# Patient Record
Sex: FEMALE | URBAN - METROPOLITAN AREA
[De-identification: names, ages, dates, MRNs, and addresses within clinical notes are randomized per-mention and may not be internally consistent; named-entity substitution may affect disease eponyms.]

---

## 2021-04-28 ENCOUNTER — EMERGENCY (EMERGENCY)
Facility: HOSPITAL | Age: 56
LOS: 1 days | Discharge: HOME | End: 2021-04-28
Attending: EMERGENCY MEDICINE
Payer: COMMERCIAL

## 2021-04-28 VITALS
TEMPERATURE: 98 F | OXYGEN SATURATION: 100 % | HEIGHT: 70 IN | SYSTOLIC BLOOD PRESSURE: 152 MMHG | RESPIRATION RATE: 16 BRPM | WEIGHT: 147.05 LBS | DIASTOLIC BLOOD PRESSURE: 103 MMHG | HEART RATE: 68 BPM

## 2021-04-28 LAB
APPEARANCE UR: CLEAR — SIGNIFICANT CHANGE UP
BACTERIA # UR AUTO: ABNORMAL
BILIRUB UR-MCNC: ABNORMAL
COLOR SPEC: YELLOW — SIGNIFICANT CHANGE UP
DIFF PNL FLD: ABNORMAL
EPI CELLS # UR: SIGNIFICANT CHANGE UP
GLUCOSE UR QL: NEGATIVE — SIGNIFICANT CHANGE UP
KETONES UR-MCNC: NEGATIVE — SIGNIFICANT CHANGE UP
LEUKOCYTE ESTERASE UR-ACNC: NEGATIVE — SIGNIFICANT CHANGE UP
NITRITE UR-MCNC: POSITIVE
PH UR: 7 — SIGNIFICANT CHANGE UP (ref 5–8)
PROT UR-MCNC: NEGATIVE — SIGNIFICANT CHANGE UP
RBC CASTS # UR COMP ASSIST: ABNORMAL /HPF (ref 0–4)
SP GR SPEC: 1 — LOW (ref 1.01–1.02)
UROBILINOGEN FLD QL: 1
WBC UR QL: SIGNIFICANT CHANGE UP

## 2021-04-28 PROCEDURE — 74176 CT ABD & PELVIS W/O CONTRAST: CPT | Mod: 26,MG

## 2021-04-28 PROCEDURE — 99284 EMERGENCY DEPT VISIT MOD MDM: CPT | Mod: 25

## 2021-04-28 PROCEDURE — 74176 CT ABD & PELVIS W/O CONTRAST: CPT

## 2021-04-28 PROCEDURE — 81001 URINALYSIS AUTO W/SCOPE: CPT

## 2021-04-28 PROCEDURE — 96372 THER/PROPH/DIAG INJ SC/IM: CPT

## 2021-04-28 PROCEDURE — 87086 URINE CULTURE/COLONY COUNT: CPT

## 2021-04-28 PROCEDURE — 99284 EMERGENCY DEPT VISIT MOD MDM: CPT

## 2021-04-28 PROCEDURE — G1004: CPT

## 2021-04-28 RX ORDER — TAMSULOSIN HYDROCHLORIDE 0.4 MG/1
1 CAPSULE ORAL
Qty: 30 | Refills: 0
Start: 2021-04-28 | End: 2021-05-27

## 2021-04-28 RX ORDER — ONDANSETRON 8 MG/1
4 TABLET, FILM COATED ORAL ONCE
Refills: 0 | Status: COMPLETED | OUTPATIENT
Start: 2021-04-28 | End: 2021-04-28

## 2021-04-28 RX ORDER — CEPHALEXIN 500 MG
500 CAPSULE ORAL ONCE
Refills: 0 | Status: DISCONTINUED | OUTPATIENT
Start: 2021-04-28 | End: 2021-04-28

## 2021-04-28 RX ORDER — OXYCODONE AND ACETAMINOPHEN 5; 325 MG/1; MG/1
1 TABLET ORAL
Qty: 7 | Refills: 0
Start: 2021-04-28 | End: 2021-05-04

## 2021-04-28 RX ORDER — KETOROLAC TROMETHAMINE 30 MG/ML
15 SYRINGE (ML) INJECTION ONCE
Refills: 0 | Status: DISCONTINUED | OUTPATIENT
Start: 2021-04-28 | End: 2021-04-28

## 2021-04-28 RX ORDER — IBUPROFEN 200 MG
1 TABLET ORAL
Qty: 16 | Refills: 0
Start: 2021-04-28 | End: 2021-05-01

## 2021-04-28 RX ADMIN — Medication 15 MILLIGRAM(S): at 11:57

## 2021-04-28 NOTE — ED STATDOCS - PATIENT PORTAL LINK FT
You can access the FollowMyHealth Patient Portal offered by [facility] by registering at the following website: [email or web address]. By joining [employer]’s FollowMyHealth portal, you will also be able to view your health information using other applications (apps) compatible with our system.

## 2021-04-28 NOTE — ED STATDOCS - CARE PROVIDER_API CALL
Jimmie Walker)  Urology  [address], [address]  [place] [address]  Phone: [phone]  Fax: [phone]  Follow Up Time:

## 2021-04-28 NOTE — ED STATDOCS - PROGRESS NOTE DETAILS
pt was already on antibiotic for UTI - had negative urine culture 5 days ago - afebile - will wait for culture results before treating

## 2021-04-28 NOTE — ED STATDOCS - OBJECTIVE STATEMENT
54 y/o F with PMHx of HTN and asymptomatic kidney stones presents to ED c/o 2 weeks of feeling pelvic pressure s/p treatment for UTI. Pt had urine culture with GYN last week that was negative, developed left flank pain today. Donis dysuria, fever, chills. Unsure if hematuria because prescribed azo. (+) nausea, no vomiting. No other symptoms.

## 2021-04-28 NOTE — ED STATDOCS - ATTENDING CONTRIBUTION TO CARE
Lucía: I performed a face to face bedside interview with patient regarding history of present illness, review of symptoms and past medical history. I completed an independent physical exam and ordered tests/medications as needed.  I have discussed patient's plan of care with advanced care provider. The advanced care provider assisted in  executing the discussed plan. I was available for any questions or issues that may have arose during the execution of the plan of care.

## 2021-04-28 NOTE — ED STATDOCS - NS ED ROS FT
ROS: No fever/chills. No eye pain/changes in vision, No ear pain/sore throat/dysphagia, No chest pain/palpitations. No SOB/cough/. No abdominal pain, (+) nausea, no V/D, no black/bloody bm. No dysuria/frequency/discharge, No headache. No Dizziness.    No rashes or breaks in skin. No numbness/tingling/weakness. (+) left flank pain, (+) pelvic pressure

## 2021-04-28 NOTE — ED STATDOCS - CLINICAL SUMMARY MEDICAL DECISION MAKING FREE TEXT BOX
Pt with lower pressure and 1 day of left flank pain. Will check urine, CT renal, symptoms control and reassess.

## 2021-04-29 LAB
CULTURE RESULTS: SIGNIFICANT CHANGE UP
SPECIMEN SOURCE: SIGNIFICANT CHANGE UP

## 2021-09-25 ENCOUNTER — EMERGENCY (EMERGENCY)
Facility: HOSPITAL | Age: 56
LOS: 1 days | Discharge: HOME | End: 2021-09-25
Attending: EMERGENCY MEDICINE
Payer: COMMERCIAL

## 2021-09-25 VITALS
HEART RATE: 58 BPM | TEMPERATURE: 98 F | SYSTOLIC BLOOD PRESSURE: 184 MMHG | RESPIRATION RATE: 18 BRPM | HEIGHT: 70 IN | WEIGHT: 149.91 LBS | OXYGEN SATURATION: 98 % | DIASTOLIC BLOOD PRESSURE: 81 MMHG

## 2021-09-25 LAB
APPEARANCE UR: CLEAR — SIGNIFICANT CHANGE UP
BACTERIA # UR AUTO: ABNORMAL
BASOPHILS # BLD AUTO: 0.05 K/UL — SIGNIFICANT CHANGE UP (ref 0–0.2)
BASOPHILS NFR BLD AUTO: 0.6 % — SIGNIFICANT CHANGE UP (ref 0–2)
BILIRUB UR-MCNC: ABNORMAL
COLOR SPEC: YELLOW — SIGNIFICANT CHANGE UP
DIFF PNL FLD: ABNORMAL
EOSINOPHIL # BLD AUTO: 0.16 K/UL — SIGNIFICANT CHANGE UP (ref 0–0.5)
EOSINOPHIL NFR BLD AUTO: 2 % — SIGNIFICANT CHANGE UP (ref 0–6)
EPI CELLS # UR: SIGNIFICANT CHANGE UP
GLUCOSE UR QL: NEGATIVE — SIGNIFICANT CHANGE UP
HCT VFR BLD CALC: 38.4 % — SIGNIFICANT CHANGE UP (ref 34.5–45)
HGB BLD-MCNC: 12.8 G/DL — SIGNIFICANT CHANGE UP (ref 11.5–15.5)
IMM GRANULOCYTES NFR BLD AUTO: 0.3 % — SIGNIFICANT CHANGE UP (ref 0–1.5)
KETONES UR-MCNC: NEGATIVE — SIGNIFICANT CHANGE UP
LEUKOCYTE ESTERASE UR-ACNC: NEGATIVE — SIGNIFICANT CHANGE UP
LYMPHOCYTES # BLD AUTO: 1.03 K/UL — SIGNIFICANT CHANGE UP (ref 1–3.3)
LYMPHOCYTES # BLD AUTO: 13 % — SIGNIFICANT CHANGE UP (ref 13–44)
MCHC RBC-ENTMCNC: 30 PG — SIGNIFICANT CHANGE UP (ref 27–34)
MCHC RBC-ENTMCNC: 33.3 GM/DL — SIGNIFICANT CHANGE UP (ref 32–36)
MCV RBC AUTO: 89.9 FL — SIGNIFICANT CHANGE UP (ref 80–100)
MONOCYTES # BLD AUTO: 0.45 K/UL — SIGNIFICANT CHANGE UP (ref 0–0.9)
MONOCYTES NFR BLD AUTO: 5.7 % — SIGNIFICANT CHANGE UP (ref 2–14)
NEUTROPHILS # BLD AUTO: 6.24 K/UL — SIGNIFICANT CHANGE UP (ref 1.8–7.4)
NEUTROPHILS NFR BLD AUTO: 78.4 % — HIGH (ref 43–77)
NITRITE UR-MCNC: POSITIVE
PH UR: 7 — SIGNIFICANT CHANGE UP (ref 5–8)
PLATELET # BLD AUTO: 187 K/UL — SIGNIFICANT CHANGE UP (ref 150–400)
PROT UR-MCNC: NEGATIVE — SIGNIFICANT CHANGE UP
RBC # BLD: 4.27 M/UL — SIGNIFICANT CHANGE UP (ref 3.8–5.2)
RBC # FLD: 13.1 % — SIGNIFICANT CHANGE UP (ref 10.3–14.5)
RBC CASTS # UR COMP ASSIST: ABNORMAL /HPF (ref 0–4)
SP GR SPEC: 1.01 — SIGNIFICANT CHANGE UP (ref 1.01–1.02)
UROBILINOGEN FLD QL: 4
WBC # BLD: 7.95 K/UL — SIGNIFICANT CHANGE UP (ref 3.8–10.5)
WBC # FLD AUTO: 7.95 K/UL — SIGNIFICANT CHANGE UP (ref 3.8–10.5)
WBC UR QL: SIGNIFICANT CHANGE UP

## 2021-09-25 PROCEDURE — 99220: CPT

## 2021-09-25 RX ORDER — KETOROLAC TROMETHAMINE 30 MG/ML
15 SYRINGE (ML) INJECTION ONCE
Refills: 0 | Status: DISCONTINUED | OUTPATIENT
Start: 2021-09-25 | End: 2021-09-25

## 2021-09-25 RX ORDER — ONDANSETRON 8 MG/1
4 TABLET, FILM COATED ORAL ONCE
Refills: 0 | Status: COMPLETED | OUTPATIENT
Start: 2021-09-25 | End: 2021-09-25

## 2021-09-25 RX ORDER — SODIUM CHLORIDE 9 MG/ML
1000 INJECTION, SOLUTION INTRAVENOUS ONCE
Refills: 0 | Status: COMPLETED | OUTPATIENT
Start: 2021-09-25 | End: 2021-09-25

## 2021-09-25 RX ADMIN — Medication 15 MILLIGRAM(S): at 23:30

## 2021-09-25 RX ADMIN — ONDANSETRON 4 MILLIGRAM(S): 8 TABLET, FILM COATED ORAL at 23:30

## 2021-09-25 RX ADMIN — SODIUM CHLORIDE 1000 MILLILITER(S): 9 INJECTION, SOLUTION INTRAVENOUS at 23:30

## 2021-09-25 NOTE — ED ADULT NURSE NOTE - CHIEF COMPLAINT QUOTE
patient states that she has kidney stones complaining of right side pain radiating to her back, blood in urine

## 2021-09-25 NOTE — ED ADULT TRIAGE NOTE - CHIEF COMPLAINT QUOTE
patient states that she has kidney stones complaining of right side pain radiating to her back patient states that she has kidney stones complaining of right side pain radiating to her back, blood in urine

## 2021-09-25 NOTE — ED ADULT NURSE NOTE - OBJECTIVE STATEMENT
Pt. c/o right flank pain radiating to right lower abd. 10/10, states sudden onset.  Hx. of kidney stones.  Denies urinary sxs/N/V/fever.

## 2021-09-26 VITALS
DIASTOLIC BLOOD PRESSURE: 79 MMHG | HEART RATE: 62 BPM | TEMPERATURE: 98 F | OXYGEN SATURATION: 98 % | SYSTOLIC BLOOD PRESSURE: 160 MMHG | RESPIRATION RATE: 18 BRPM

## 2021-09-26 LAB
ALBUMIN SERPL ELPH-MCNC: 4.2 G/DL — SIGNIFICANT CHANGE UP (ref 3.3–5.2)
ALP SERPL-CCNC: 79 U/L — SIGNIFICANT CHANGE UP (ref 40–120)
ALT FLD-CCNC: 15 U/L — SIGNIFICANT CHANGE UP
ANION GAP SERPL CALC-SCNC: 12 MMOL/L — SIGNIFICANT CHANGE UP (ref 5–17)
AST SERPL-CCNC: 18 U/L — SIGNIFICANT CHANGE UP
BILIRUB SERPL-MCNC: 0.2 MG/DL — LOW (ref 0.4–2)
BUN SERPL-MCNC: 17.4 MG/DL — SIGNIFICANT CHANGE UP (ref 8–20)
CALCIUM SERPL-MCNC: 8.7 MG/DL — SIGNIFICANT CHANGE UP (ref 8.6–10.2)
CHLORIDE SERPL-SCNC: 100 MMOL/L — SIGNIFICANT CHANGE UP (ref 98–107)
CO2 SERPL-SCNC: 27 MMOL/L — SIGNIFICANT CHANGE UP (ref 22–29)
CREAT SERPL-MCNC: 0.51 MG/DL — SIGNIFICANT CHANGE UP (ref 0.5–1.3)
GLUCOSE SERPL-MCNC: 128 MG/DL — HIGH (ref 70–99)
HCG SERPL-ACNC: <4 MIU/ML — SIGNIFICANT CHANGE UP
POTASSIUM SERPL-MCNC: 4 MMOL/L — SIGNIFICANT CHANGE UP (ref 3.5–5.3)
POTASSIUM SERPL-SCNC: 4 MMOL/L — SIGNIFICANT CHANGE UP (ref 3.5–5.3)
PROT SERPL-MCNC: 6.5 G/DL — LOW (ref 6.6–8.7)
SODIUM SERPL-SCNC: 139 MMOL/L — SIGNIFICANT CHANGE UP (ref 135–145)

## 2021-09-26 PROCEDURE — G1004: CPT

## 2021-09-26 PROCEDURE — G0378: CPT

## 2021-09-26 PROCEDURE — 36415 COLL VENOUS BLD VENIPUNCTURE: CPT

## 2021-09-26 PROCEDURE — 84702 CHORIONIC GONADOTROPIN TEST: CPT

## 2021-09-26 PROCEDURE — 74176 CT ABD & PELVIS W/O CONTRAST: CPT | Mod: ME

## 2021-09-26 PROCEDURE — 96374 THER/PROPH/DIAG INJ IV PUSH: CPT

## 2021-09-26 PROCEDURE — 87086 URINE CULTURE/COLONY COUNT: CPT

## 2021-09-26 PROCEDURE — 74176 CT ABD & PELVIS W/O CONTRAST: CPT | Mod: 26,ME

## 2021-09-26 PROCEDURE — 80053 COMPREHEN METABOLIC PANEL: CPT

## 2021-09-26 PROCEDURE — 96375 TX/PRO/DX INJ NEW DRUG ADDON: CPT

## 2021-09-26 PROCEDURE — 99284 EMERGENCY DEPT VISIT MOD MDM: CPT | Mod: 25

## 2021-09-26 PROCEDURE — 96376 TX/PRO/DX INJ SAME DRUG ADON: CPT

## 2021-09-26 PROCEDURE — 81001 URINALYSIS AUTO W/SCOPE: CPT

## 2021-09-26 PROCEDURE — 85025 COMPLETE CBC W/AUTO DIFF WBC: CPT

## 2021-09-26 RX ORDER — ONDANSETRON 8 MG/1
1 TABLET, FILM COATED ORAL
Qty: 10 | Refills: 0
Start: 2021-09-26

## 2021-09-26 RX ORDER — KETOROLAC TROMETHAMINE 30 MG/ML
15 SYRINGE (ML) INJECTION ONCE
Refills: 0 | Status: DISCONTINUED | OUTPATIENT
Start: 2021-09-26 | End: 2021-09-30

## 2021-09-26 RX ORDER — CEPHALEXIN 500 MG
1 CAPSULE ORAL
Qty: 28 | Refills: 0
Start: 2021-09-26 | End: 2021-10-02

## 2021-09-26 RX ORDER — CEPHALEXIN 500 MG
500 CAPSULE ORAL EVERY 6 HOURS
Refills: 0 | Status: DISCONTINUED | OUTPATIENT
Start: 2021-09-26 | End: 2021-09-30

## 2021-09-26 RX ORDER — OXYCODONE AND ACETAMINOPHEN 5; 325 MG/1; MG/1
1 TABLET ORAL
Qty: 6 | Refills: 0
Start: 2021-09-26 | End: 2021-09-27

## 2021-09-26 RX ORDER — ACETAMINOPHEN 500 MG
650 TABLET ORAL EVERY 6 HOURS
Refills: 0 | Status: DISCONTINUED | OUTPATIENT
Start: 2021-09-26 | End: 2021-09-30

## 2021-09-26 RX ORDER — IBUPROFEN 200 MG
1 TABLET ORAL
Qty: 15 | Refills: 0
Start: 2021-09-26

## 2021-09-26 RX ORDER — TAMSULOSIN HYDROCHLORIDE 0.4 MG/1
0.4 CAPSULE ORAL ONCE
Refills: 0 | Status: COMPLETED | OUTPATIENT
Start: 2021-09-26 | End: 2021-09-26

## 2021-09-26 RX ORDER — CEFTRIAXONE 500 MG/1
1000 INJECTION, POWDER, FOR SOLUTION INTRAMUSCULAR; INTRAVENOUS ONCE
Refills: 0 | Status: COMPLETED | OUTPATIENT
Start: 2021-09-26 | End: 2021-09-26

## 2021-09-26 RX ORDER — KETOROLAC TROMETHAMINE 30 MG/ML
15 SYRINGE (ML) INJECTION ONCE
Refills: 0 | Status: DISCONTINUED | OUTPATIENT
Start: 2021-09-26 | End: 2021-09-26

## 2021-09-26 RX ORDER — MORPHINE SULFATE 50 MG/1
2 CAPSULE, EXTENDED RELEASE ORAL EVERY 4 HOURS
Refills: 0 | Status: DISCONTINUED | OUTPATIENT
Start: 2021-09-26 | End: 2021-09-26

## 2021-09-26 RX ORDER — ONDANSETRON 8 MG/1
4 TABLET, FILM COATED ORAL EVERY 8 HOURS
Refills: 0 | Status: DISCONTINUED | OUTPATIENT
Start: 2021-09-26 | End: 2021-09-30

## 2021-09-26 RX ORDER — CEFPODOXIME PROXETIL 100 MG
200 TABLET ORAL EVERY 12 HOURS
Refills: 0 | Status: DISCONTINUED | OUTPATIENT
Start: 2021-09-26 | End: 2021-09-26

## 2021-09-26 RX ORDER — TAMSULOSIN HYDROCHLORIDE 0.4 MG/1
1 CAPSULE ORAL
Qty: 10 | Refills: 0
Start: 2021-09-26 | End: 2021-10-05

## 2021-09-26 RX ORDER — SODIUM CHLORIDE 9 MG/ML
1000 INJECTION, SOLUTION INTRAVENOUS ONCE
Refills: 0 | Status: COMPLETED | OUTPATIENT
Start: 2021-09-26 | End: 2021-09-26

## 2021-09-26 RX ORDER — SODIUM CHLORIDE 9 MG/ML
1000 INJECTION, SOLUTION INTRAVENOUS ONCE
Refills: 0 | Status: DISCONTINUED | OUTPATIENT
Start: 2021-09-26 | End: 2021-09-26

## 2021-09-26 RX ADMIN — Medication 15 MILLIGRAM(S): at 04:49

## 2021-09-26 RX ADMIN — SODIUM CHLORIDE 250 MILLILITER(S): 9 INJECTION, SOLUTION INTRAVENOUS at 09:30

## 2021-09-26 RX ADMIN — Medication 15 MILLIGRAM(S): at 06:43

## 2021-09-26 RX ADMIN — Medication 15 MILLIGRAM(S): at 06:42

## 2021-09-26 RX ADMIN — Medication 500 MILLIGRAM(S): at 11:43

## 2021-09-26 RX ADMIN — CEFTRIAXONE 100 MILLIGRAM(S): 500 INJECTION, POWDER, FOR SOLUTION INTRAMUSCULAR; INTRAVENOUS at 01:00

## 2021-09-26 RX ADMIN — TAMSULOSIN HYDROCHLORIDE 0.4 MILLIGRAM(S): 0.4 CAPSULE ORAL at 11:43

## 2021-09-26 NOTE — ED CDU PROVIDER DISPOSITION NOTE - CARE PROVIDER_API CALL
Jimmie Wlaker  UROLOGY  [address] [address] [place] [address]  Phone: [phone]  Fax: [phone]  Follow Up Time:

## 2021-09-26 NOTE — ED CDU PROVIDER DISPOSITION NOTE - NS_EDPROVIDERDISPOUSERTYPE_ED_A_ED
RIGHT KNEE 3 VIEWS



INDICATION / CLINICAL INFORMATION:

MAIN



COMPARISON:

None available.

 

FINDINGS:



BONES / JOINT(S): No acute fracture or subluxation. No significant arthritis.

SOFT TISSUES: No significant abnormality.



ADDITIONAL FINDINGS: None.







 



Signer Name: Scar Sanabria MD 

Signed: 3/10/2021 11:16 PM

Workstation Name: [id number] Attending Attestation (For Attendings USE Only)...

## 2021-09-26 NOTE — ED CDU PROVIDER DISPOSITION NOTE - PATIENT PORTAL LINK FT
You can access the FollowMyHealth Patient Portal offered by [facility] by registering at the following website: [email or web address]. By joining [employer]’s FollowMyHealth portal, you will also be able to view your health information using other applications (apps) compatible with our system.

## 2021-09-26 NOTE — ED CDU PROVIDER INITIAL DAY NOTE - MEDICAL DECISION MAKING DETAILS
pt with 1 week of uti symptoms . started on pyridium by GYN with right flank pain and chills and N/V x 1 days ct with kidney stone  vs pyelonephritis and UA + uti    fluids, Flomax, Rocephin ., pain control , re eval

## 2021-09-26 NOTE — ED CDU PROVIDER INITIAL DAY NOTE - PHYSICAL EXAMINATION
Const: AOX3 nontoxic appearing, no apparent respiratory or physical distress. Stable gait   HEENT: NC/AT. Moist mucous membranes.  Eyes: LOVELY. EOMI  Neck: Soft and supple. Full ROM without pain.  cardaic :No murmurs. Peripheral pulses 2+ and symmetric. No LE edema.  Resp: Speaking in full sentences. No evidence of respiratory distress.   Abd: Soft, tender supra pubic area , non-distended. Normal bowel sounds in all 4 quadrants. No guarding or rebound.  Back: Spine midline and non-tender. Right CVA TTP   Skin: No rashes, abrasions or lacerations.  Lymph: No cervical lymphadenopathy.  Neuro: Awake, alert & oriented x 3. Moves all extremities symmetrically.

## 2021-09-26 NOTE — ED PROVIDER NOTE - OBJECTIVE STATEMENT
55yof w/ hx of kidney stones has been having dull achy right flank pain and urinary urgency/pressure for the past several days, initially waxing and waning but now constant since yesterday and associated with hematuria. Endorses chills and nausea but no fever.

## 2021-09-26 NOTE — ED PROVIDER NOTE - CLINICAL SUMMARY MEDICAL DECISION MAKING FREE TEXT BOX
Right flank pain with obstructing UVJ stone, persistent pain despite toradol x 2, and nitrite pos UA. Not septic but covered with ceftriaxone, will keep in ED obs for continued IV abx, pain control, urology consult.

## 2021-09-26 NOTE — ED CDU PROVIDER DISPOSITION NOTE - CLINICAL COURSE
56 Y/o female hx of HTN and kidney stone S.p had uti symptoms had  F.u and  had GYN f.u . pt started on pyridium start having right side  of the flank and chills and nausea / vomiting / kept on obs Ua +uti 2nd to pyridium ? tx with ceftriaxone . able tolerated liquids . pt dose not wish stay any longer and want to go home . she feels find will d.c and f.u pcp and urology

## 2021-09-26 NOTE — ED CDU PROVIDER DISPOSITION NOTE - ATTENDING CONTRIBUTION TO CARE
Barrera TREADWELL- 56 Y/O F with h/o uti p/w dysuria and frequency and suprapubic pain and placed in cdu for pain control. Pt found to have multiple small kidney stones with uti.    Pt is alert, well appearing female, s1s2 normal reg, b/l clear breath sounds, abd soft, nt, nd, no cvat b/l, neuro exam aox3, cn 2-12 intact, no focal deficits, skin warm, dry, good turgor    plan to discharge with pain meds and keflex, urology f/u

## 2021-09-26 NOTE — ED CDU PROVIDER INITIAL DAY NOTE - OBJECTIVE STATEMENT
56 y/o F with PMHx of HTN and asymptomatic kidney stones presents to ED c/o 1 weeks feeling uti symptoms had f.u with her GYn start her on the pyridium . then she start having left flank pain and feeling chills with nausea and vomiting and urgency sensation . states she drove in ER . pain now 7/10

## 2021-09-27 LAB
CULTURE RESULTS: SIGNIFICANT CHANGE UP
SPECIMEN SOURCE: SIGNIFICANT CHANGE UP

## 2022-01-10 PROBLEM — Z82.49 FAMILY HISTORY OF ISCHEMIC HEART DISEASE AND OTHER DISEASES OF THE CIRCULATORY SYSTEM: Chronic | Status: ACTIVE | Noted: 2021-09-26

## 2022-01-10 PROBLEM — Z00.00 ENCOUNTER FOR PREVENTIVE HEALTH EXAMINATION: Status: ACTIVE | Noted: 2022-01-10

## 2022-01-10 PROBLEM — N20.0 CALCULUS OF KIDNEY: Chronic | Status: ACTIVE | Noted: 2021-09-26

## 2022-01-13 ENCOUNTER — APPOINTMENT (OUTPATIENT)
Dept: ORTHOPEDIC SURGERY | Facility: CLINIC | Age: 57
End: 2022-01-13
Payer: COMMERCIAL

## 2022-01-13 VITALS
WEIGHT: 150 LBS | BODY MASS INDEX: 21.47 KG/M2 | SYSTOLIC BLOOD PRESSURE: 122 MMHG | HEART RATE: 66 BPM | HEIGHT: 70 IN | DIASTOLIC BLOOD PRESSURE: 80 MMHG

## 2022-01-13 DIAGNOSIS — Z78.0 ASYMPTOMATIC MENOPAUSAL STATE: ICD-10-CM

## 2022-01-13 DIAGNOSIS — M18.12 UNILATERAL PRIMARY OSTEOARTHRITIS OF FIRST CARPOMETACARPAL JOINT, LEFT HAND: ICD-10-CM

## 2022-01-13 DIAGNOSIS — Z86.79 PERSONAL HISTORY OF OTHER DISEASES OF THE CIRCULATORY SYSTEM: ICD-10-CM

## 2022-01-13 PROCEDURE — 73130 X-RAY EXAM OF HAND: CPT | Mod: LT

## 2022-01-13 PROCEDURE — 99204 OFFICE O/P NEW MOD 45 MIN: CPT | Mod: 25

## 2022-01-13 PROCEDURE — 99072 ADDL SUPL MATRL&STAF TM PHE: CPT

## 2022-01-13 PROCEDURE — 20600 DRAIN/INJ JOINT/BURSA W/O US: CPT | Mod: FA

## 2022-01-13 RX ORDER — DICLOFENAC SODIUM 3 G/100G
3 GEL TOPICAL TWICE DAILY
Qty: 1 | Refills: 2 | Status: ACTIVE | COMMUNITY
Start: 2022-01-13 | End: 1900-01-01

## 2022-01-20 ENCOUNTER — APPOINTMENT (OUTPATIENT)
Dept: PHYSICAL MEDICINE AND REHAB | Facility: CLINIC | Age: 57
End: 2022-01-20
Payer: COMMERCIAL

## 2022-01-20 VITALS
SYSTOLIC BLOOD PRESSURE: 156 MMHG | HEART RATE: 66 BPM | DIASTOLIC BLOOD PRESSURE: 106 MMHG | HEIGHT: 70 IN | BODY MASS INDEX: 21.33 KG/M2 | WEIGHT: 149 LBS | RESPIRATION RATE: 12 BRPM

## 2022-01-20 DIAGNOSIS — M47.812 SPONDYLOSIS W/OUT MYELOPATHY OR RADICULOPATHY, CERVICAL REGION: ICD-10-CM

## 2022-01-20 DIAGNOSIS — Z78.9 OTHER SPECIFIED HEALTH STATUS: ICD-10-CM

## 2022-01-20 DIAGNOSIS — Z87.442 PERSONAL HISTORY OF URINARY CALCULI: ICD-10-CM

## 2022-01-20 PROCEDURE — 99204 OFFICE O/P NEW MOD 45 MIN: CPT

## 2022-01-20 PROCEDURE — 99072 ADDL SUPL MATRL&STAF TM PHE: CPT

## 2022-01-20 RX ORDER — MELOXICAM 15 MG/1
15 TABLET ORAL
Qty: 30 | Refills: 1 | Status: ACTIVE | COMMUNITY
Start: 2022-01-20 | End: 1900-01-01

## 2022-01-20 NOTE — HISTORY OF PRESENT ILLNESS
[FreeTextEntry1] : 56 y.o. F presents to office w/ c/o right periscapular pain over the last year.  Pt. denies antecedent trauma or injury to neck or shoulder.  Pt. went to local chiro where x-rays were obtained.  Results not available for my review.  Pt. states that after manipulation "she saw stars" - pain was much worse.  Pt. denies radiating arm pain or N/T/B in hand.  Pt. has been taking OTC ibuprofen prn.  Massage has been helpful.  No P.T.  No injections.

## 2022-01-20 NOTE — ASSESSMENT
[FreeTextEntry1] : 56 y.o. F w/ h/o chronic neck and right periscapular MPS suggestive of cervical spondylosis and scapulothoracic bursitis, resp.  No clinical evidence of right shoulder RC dysfunction on examination.  I spent most of today's office visit (30 min) discussing etiology, pathogenesis and further non-operative management.  Will try to obtain copies of patient's C-spine x-rays for my review.  Discussed R/B/A to short course of meloxicam 15 mg; one tab po qd x 1-2 weeks as tolerated.  Rx P.T. for modalities, gentle ROM, stretching and stabilization exercises.  May consider cervical TPI +/- US guided right scapulothoracic interval CSI if patient is unable to advance her rehab program.  Pt. is in agreement with plan.  All questions answered.  RTC 6-8 weeks or sooner should need arise.

## 2022-01-20 NOTE — PHYSICAL EXAM
[FreeTextEntry1] : NAD\par A&Ox3\par Non-obese\par C-spine ROM: 2 FBs chin to sternum; 30' extension; 25' R LR; 45-50' L LR\par Coates's: neg\par Lhermitte's: neg\par Spurling's: neg\par DTR's: 2+ B/L B/T/Br\par MMT: 5/5 b/l UE\par Sensation: SILT\par Palpation: R mid portion sup trap muscle hypertonicity/spasm; R medial border scapula TTP; no scapula winging; + crepitation\par ROM Right Shoulder: full ABD/FF w/o pain\par Neer's: neg\par Hawkin's: neg\par Drop Arm: neg\par Scarf: deferred\par RC MMT: 5/5 b/l SS/IS\par \par \par

## 2022-08-27 ENCOUNTER — EMERGENCY (EMERGENCY)
Facility: HOSPITAL | Age: 57
LOS: 1 days | Discharge: HOME | End: 2022-08-27
Attending: EMERGENCY MEDICINE
Payer: COMMERCIAL

## 2022-08-27 VITALS
OXYGEN SATURATION: 99 % | TEMPERATURE: 98 F | DIASTOLIC BLOOD PRESSURE: 102 MMHG | HEART RATE: 71 BPM | RESPIRATION RATE: 16 BRPM | SYSTOLIC BLOOD PRESSURE: 172 MMHG | HEIGHT: 70 IN

## 2022-08-27 LAB
HCT VFR BLD CALC: 38.7 % — SIGNIFICANT CHANGE UP (ref 34.5–45)
HGB BLD-MCNC: 12.8 G/DL — SIGNIFICANT CHANGE UP (ref 11.5–15.5)
MCHC RBC-ENTMCNC: 29.6 PG — SIGNIFICANT CHANGE UP (ref 27–34)
MCHC RBC-ENTMCNC: 33.1 GM/DL — SIGNIFICANT CHANGE UP (ref 32–36)
MCV RBC AUTO: 89.6 FL — SIGNIFICANT CHANGE UP (ref 80–100)
PLATELET # BLD AUTO: 205 K/UL — SIGNIFICANT CHANGE UP (ref 150–400)
RBC # BLD: 4.32 M/UL — SIGNIFICANT CHANGE UP (ref 3.8–5.2)
RBC # FLD: 13.3 % — SIGNIFICANT CHANGE UP (ref 10.3–14.5)
WBC # BLD: 6.87 K/UL — SIGNIFICANT CHANGE UP (ref 3.8–10.5)
WBC # FLD AUTO: 6.87 K/UL — SIGNIFICANT CHANGE UP (ref 3.8–10.5)

## 2022-08-27 PROCEDURE — 99284 EMERGENCY DEPT VISIT MOD MDM: CPT

## 2022-08-27 RX ORDER — ONDANSETRON 8 MG/1
4 TABLET, FILM COATED ORAL ONCE
Refills: 0 | Status: COMPLETED | OUTPATIENT
Start: 2022-08-27 | End: 2022-08-27

## 2022-08-27 RX ORDER — MORPHINE SULFATE 50 MG/1
2 CAPSULE, EXTENDED RELEASE ORAL ONCE
Refills: 0 | Status: DISCONTINUED | OUTPATIENT
Start: 2022-08-27 | End: 2022-08-27

## 2022-08-27 NOTE — ED PROVIDER NOTE - PATIENT PORTAL LINK FT
You can access the FollowMyHealth Patient Portal offered by [facility] by registering at the following website: [email or web address]. By joining [employer]’s FollowMyHealth portal, you will also be able to view your health information using other applications (apps) compatible with our system.

## 2022-08-27 NOTE — ED ADULT NURSE NOTE - OBJECTIVE STATEMENT
Pt in no apparent distress at this time. Airway patent, breathing spontaneous and nonlabored. Pt A&Ox3 resting in stretcher. Pt c/o       , left side abdominal pain radiating to the back. similar pain to kidney stones aprox 1 year ago,

## 2022-08-27 NOTE — ED PROVIDER NOTE - CLINICAL SUMMARY MEDICAL DECISION MAKING FREE TEXT BOX
56F with left flank pain. CTAP with left sided non obstructing stone. FU with urology. Rx for pain meds, flomax, zofran. Discussed ED return precautions. 56F with left flank pain. CTAP with left sided non obstructing stone. No vomiting, fever, leukocytosis. FU with urology. Rx for pain meds, flomax, zofran. Discussed ED return precautions and all questions answered. Pt requesting new urologist, info given.

## 2022-08-27 NOTE — ED PROVIDER NOTE - CARE PROVIDER_API CALL
Rubén Reyes)  Urology  [address], [address]  [place] [address]  Phone: [phone]  Fax: [phone]  Follow Up Time:

## 2022-08-27 NOTE — ED PROVIDER NOTE - ATTENDING APP SHARED VISIT CONTRIBUTION OF CARE
I personally saw the patient with the PA, and completed the key components of the history and physical exam. I then discussed the management plan with the PA.   General: NAD, ENMT: Airway patent, mucous membranes moist, Cardiac: Normal rate, regular rhythm, Respiratory: breath sounds equal and clear bilaterally Abd: soft, nd, + left CVAT

## 2022-08-27 NOTE — ED PROVIDER NOTE - OBJECTIVE STATEMENT
59F with h/o renal stones presenting with left flank pain and nausea x 5 hrs.   No vomiting. 59F with h/o renal stones presenting with sudden onset left flank pain and nausea x 5 hrs.   No vomiting, no fever.

## 2022-08-27 NOTE — ED ADULT TRIAGE NOTE - CHIEF COMPLAINT QUOTE
Pt. BIBA for flank pain and abdominal pain. Pt. states she  has hx of kidney stones, feels like the last one. Denies blood in her urine.

## 2022-08-27 NOTE — ED PROVIDER NOTE - NSFOLLOWUPINSTRUCTIONS_ED_ALL_ED_FT
Follow up with urologist.   Continue medications as prescribed.       Kidney Stones    Kidney stones (urolithiasis) are crystal deposits that form inside your kidneys. Pain is caused by the stone moving through the urinary tract, causing spasms of the ureter. Drink enough water and fluids to keep your urine clear or pale yellow. This will help you to pass the stone or stone fragments. If provided a strainer, strain all urine and keep all particulate matter and stones for a follow up appointment with a urologist.    SEEK IMMEDIATE MEDICAL CARE IF YOU HAVE ANY OF THE FOLLOWING SYMPTOMS: pain not controlled with medication, fever/chills, worsening vomiting, inability to urinate, or dizziness/lightheadedness. Follow up with urologist.   Continue medications as prescribed.   Return for any worsening pain.       Kidney Stones    Kidney stones (urolithiasis) are crystal deposits that form inside your kidneys. Pain is caused by the stone moving through the urinary tract, causing spasms of the ureter. Drink enough water and fluids to keep your urine clear or pale yellow. This will help you to pass the stone or stone fragments. If provided a strainer, strain all urine and keep all particulate matter and stones for a follow up appointment with a urologist.    SEEK IMMEDIATE MEDICAL CARE IF YOU HAVE ANY OF THE FOLLOWING SYMPTOMS: pain not controlled with medication, fever/chills, worsening vomiting, inability to urinate, or dizziness/lightheadedness.

## 2022-08-28 VITALS
RESPIRATION RATE: 16 BRPM | HEART RATE: 64 BPM | SYSTOLIC BLOOD PRESSURE: 165 MMHG | DIASTOLIC BLOOD PRESSURE: 89 MMHG | OXYGEN SATURATION: 99 %

## 2022-08-28 LAB
ALBUMIN SERPL ELPH-MCNC: 4.4 G/DL — SIGNIFICANT CHANGE UP (ref 3.3–5.2)
ALP SERPL-CCNC: 77 U/L — SIGNIFICANT CHANGE UP (ref 40–120)
ALT FLD-CCNC: 17 U/L — SIGNIFICANT CHANGE UP
ANION GAP SERPL CALC-SCNC: 9 MMOL/L — SIGNIFICANT CHANGE UP (ref 5–17)
APPEARANCE UR: CLEAR — SIGNIFICANT CHANGE UP
AST SERPL-CCNC: 18 U/L — SIGNIFICANT CHANGE UP
BACTERIA # UR AUTO: ABNORMAL
BILIRUB SERPL-MCNC: 0.3 MG/DL — LOW (ref 0.4–2)
BILIRUB UR-MCNC: NEGATIVE — SIGNIFICANT CHANGE UP
BUN SERPL-MCNC: 18.3 MG/DL — SIGNIFICANT CHANGE UP (ref 8–20)
CALCIUM SERPL-MCNC: 9 MG/DL — SIGNIFICANT CHANGE UP (ref 8.4–10.5)
CHLORIDE SERPL-SCNC: 99 MMOL/L — SIGNIFICANT CHANGE UP (ref 98–107)
CO2 SERPL-SCNC: 27 MMOL/L — SIGNIFICANT CHANGE UP (ref 22–29)
COLOR SPEC: YELLOW — SIGNIFICANT CHANGE UP
CREAT SERPL-MCNC: 0.46 MG/DL — LOW (ref 0.5–1.3)
DIFF PNL FLD: ABNORMAL
EGFR: 112 ML/MIN/1.73M2 — SIGNIFICANT CHANGE UP
EPI CELLS # UR: SIGNIFICANT CHANGE UP
GLUCOSE SERPL-MCNC: 130 MG/DL — HIGH (ref 70–99)
GLUCOSE UR QL: NEGATIVE MG/DL — SIGNIFICANT CHANGE UP
HCG SERPL-ACNC: <4 MIU/ML — SIGNIFICANT CHANGE UP
KETONES UR-MCNC: ABNORMAL
LEUKOCYTE ESTERASE UR-ACNC: NEGATIVE — SIGNIFICANT CHANGE UP
LIDOCAIN IGE QN: 24 U/L — SIGNIFICANT CHANGE UP (ref 22–51)
NITRITE UR-MCNC: NEGATIVE — SIGNIFICANT CHANGE UP
PH UR: 8 — SIGNIFICANT CHANGE UP (ref 5–8)
POTASSIUM SERPL-MCNC: 3.7 MMOL/L — SIGNIFICANT CHANGE UP (ref 3.5–5.3)
POTASSIUM SERPL-SCNC: 3.7 MMOL/L — SIGNIFICANT CHANGE UP (ref 3.5–5.3)
PROT SERPL-MCNC: 6.7 G/DL — SIGNIFICANT CHANGE UP (ref 6.6–8.7)
PROT UR-MCNC: NEGATIVE — SIGNIFICANT CHANGE UP
RBC CASTS # UR COMP ASSIST: ABNORMAL /HPF (ref 0–4)
SODIUM SERPL-SCNC: 135 MMOL/L — SIGNIFICANT CHANGE UP (ref 135–145)
SP GR SPEC: 1.01 — SIGNIFICANT CHANGE UP (ref 1.01–1.02)
UROBILINOGEN FLD QL: NEGATIVE MG/DL — SIGNIFICANT CHANGE UP
WBC UR QL: SIGNIFICANT CHANGE UP /HPF (ref 0–5)

## 2022-08-28 PROCEDURE — 96374 THER/PROPH/DIAG INJ IV PUSH: CPT

## 2022-08-28 PROCEDURE — 80053 COMPREHEN METABOLIC PANEL: CPT

## 2022-08-28 PROCEDURE — 74176 CT ABD & PELVIS W/O CONTRAST: CPT | Mod: ME

## 2022-08-28 PROCEDURE — 83690 ASSAY OF LIPASE: CPT

## 2022-08-28 PROCEDURE — 87086 URINE CULTURE/COLONY COUNT: CPT

## 2022-08-28 PROCEDURE — G1004: CPT

## 2022-08-28 PROCEDURE — 74176 CT ABD & PELVIS W/O CONTRAST: CPT | Mod: 26,ME

## 2022-08-28 PROCEDURE — 96361 HYDRATE IV INFUSION ADD-ON: CPT

## 2022-08-28 PROCEDURE — 99284 EMERGENCY DEPT VISIT MOD MDM: CPT | Mod: 25

## 2022-08-28 PROCEDURE — 36415 COLL VENOUS BLD VENIPUNCTURE: CPT

## 2022-08-28 PROCEDURE — 84702 CHORIONIC GONADOTROPIN TEST: CPT

## 2022-08-28 PROCEDURE — 85027 COMPLETE CBC AUTOMATED: CPT

## 2022-08-28 PROCEDURE — 81001 URINALYSIS AUTO W/SCOPE: CPT

## 2022-08-28 PROCEDURE — 96375 TX/PRO/DX INJ NEW DRUG ADDON: CPT

## 2022-08-28 RX ORDER — PANTOPRAZOLE SODIUM 20 MG/1
40 TABLET, DELAYED RELEASE ORAL ONCE
Refills: 0 | Status: COMPLETED | OUTPATIENT
Start: 2022-08-28 | End: 2022-08-28

## 2022-08-28 RX ORDER — TAMSULOSIN HYDROCHLORIDE 0.4 MG/1
1 CAPSULE ORAL
Qty: 5 | Refills: 0
Start: 2022-08-28 | End: 2022-09-01

## 2022-08-28 RX ORDER — ONDANSETRON 8 MG/1
1 TABLET, FILM COATED ORAL
Qty: 20 | Refills: 0
Start: 2022-08-28 | End: 2022-09-01

## 2022-08-28 RX ORDER — SODIUM CHLORIDE 9 MG/ML
1000 INJECTION INTRAMUSCULAR; INTRAVENOUS; SUBCUTANEOUS ONCE
Refills: 0 | Status: COMPLETED | OUTPATIENT
Start: 2022-08-28 | End: 2022-08-28

## 2022-08-28 RX ADMIN — SODIUM CHLORIDE 1000 MILLILITER(S): 9 INJECTION INTRAMUSCULAR; INTRAVENOUS; SUBCUTANEOUS at 01:13

## 2022-08-28 RX ADMIN — MORPHINE SULFATE 2 MILLIGRAM(S): 50 CAPSULE, EXTENDED RELEASE ORAL at 01:13

## 2022-08-28 RX ADMIN — PANTOPRAZOLE SODIUM 40 MILLIGRAM(S): 20 TABLET, DELAYED RELEASE ORAL at 01:43

## 2022-08-28 RX ADMIN — ONDANSETRON 4 MILLIGRAM(S): 8 TABLET, FILM COATED ORAL at 00:04

## 2022-08-28 RX ADMIN — MORPHINE SULFATE 2 MILLIGRAM(S): 50 CAPSULE, EXTENDED RELEASE ORAL at 00:03

## 2022-08-29 LAB
CULTURE RESULTS: SIGNIFICANT CHANGE UP
SPECIMEN SOURCE: SIGNIFICANT CHANGE UP

## 2022-10-11 ENCOUNTER — RX ONLY (RX ONLY)
Age: 57
End: 2022-10-11

## 2022-10-11 ENCOUNTER — OFFICE (OUTPATIENT)
Dept: URBAN - METROPOLITAN AREA CLINIC 114 | Facility: CLINIC | Age: 57
Setting detail: OPHTHALMOLOGY
End: 2022-10-11
Payer: COMMERCIAL

## 2022-10-11 DIAGNOSIS — H10.413: ICD-10-CM

## 2022-10-11 DIAGNOSIS — H01.004: ICD-10-CM

## 2022-10-11 DIAGNOSIS — H35.033: ICD-10-CM

## 2022-10-11 DIAGNOSIS — H16.222: ICD-10-CM

## 2022-10-11 DIAGNOSIS — H16.221: ICD-10-CM

## 2022-10-11 DIAGNOSIS — H01.001: ICD-10-CM

## 2022-10-11 DIAGNOSIS — H16.223: ICD-10-CM

## 2022-10-11 PROCEDURE — 83861 MICROFLUID ANALY TEARS: CPT | Performed by: OPHTHALMOLOGY

## 2022-10-11 PROCEDURE — 92250 FUNDUS PHOTOGRAPHY W/I&R: CPT | Performed by: OPHTHALMOLOGY

## 2022-10-11 PROCEDURE — 99204 OFFICE O/P NEW MOD 45 MIN: CPT | Performed by: OPHTHALMOLOGY

## 2022-10-11 ASSESSMENT — REFRACTION_CURRENTRX
OS_AXIS: 083
OD_CYLINDER: -0.25
OS_SPHERE: -3.50
OD_OVR_VA: 20/
OS_CYLINDER: -0.25
OD_SPHERE: -3.75
OD_AXIS: 129
OS_OVR_VA: 20/

## 2022-10-11 ASSESSMENT — KERATOMETRY
OD_K2POWER_DIOPTERS: 44.25
OD_AXISANGLE_DEGREES: 045
OS_K2POWER_DIOPTERS: 44.50
OS_K1POWER_DIOPTERS: 44.25
OS_AXISANGLE_DEGREES: 165
OD_K1POWER_DIOPTERS: 44.00

## 2022-10-11 ASSESSMENT — TONOMETRY
OS_IOP_MMHG: 13
OD_IOP_MMHG: 14

## 2022-10-11 ASSESSMENT — REFRACTION_AUTOREFRACTION
OS_CYLINDER: -0.50
OD_AXIS: 114
OS_SPHERE: -3.00
OS_AXIS: 080
OD_SPHERE: -3.00
OD_CYLINDER: -0.50

## 2022-10-11 ASSESSMENT — CONFRONTATIONAL VISUAL FIELD TEST (CVF)
OD_FINDINGS: FULL
OS_FINDINGS: FULL

## 2022-10-11 ASSESSMENT — AXIALLENGTH_DERIVED
OS_AL: 24.5761
OD_AL: 24.6762

## 2022-10-11 ASSESSMENT — SPHEQUIV_DERIVED
OD_SPHEQUIV: -3.25
OS_SPHEQUIV: -3.25

## 2022-10-11 ASSESSMENT — TEAR BREAK UP TIME (TBUT)
OD_TBUT: 2+ 3+
OS_TBUT: 2+

## 2022-10-11 ASSESSMENT — VISUAL ACUITY
OS_BCVA: 20/20
OD_BCVA: 20/20

## 2022-10-11 ASSESSMENT — LID EXAM ASSESSMENTS
OD_BLEPHARITIS: 1+ 2+
OS_BLEPHARITIS: 1+ 2+

## 2022-11-08 ENCOUNTER — OFFICE (OUTPATIENT)
Dept: URBAN - METROPOLITAN AREA CLINIC 114 | Facility: CLINIC | Age: 57
Setting detail: OPHTHALMOLOGY
End: 2022-11-08
Payer: COMMERCIAL

## 2022-11-08 DIAGNOSIS — H52.13: ICD-10-CM

## 2022-11-08 PROCEDURE — SCREF LASIK EVAL: Performed by: OPHTHALMOLOGY

## 2022-11-08 ASSESSMENT — TEAR BREAK UP TIME (TBUT)
OS_TBUT: 2+
OD_TBUT: 2+ 3+

## 2022-11-08 ASSESSMENT — TONOMETRY
OD_IOP_MMHG: 14
OS_IOP_MMHG: 14

## 2022-11-08 ASSESSMENT — LID EXAM ASSESSMENTS
OS_BLEPHARITIS: 1+ 2+
OD_BLEPHARITIS: 1+ 2+

## 2022-11-16 ASSESSMENT — REFRACTION_CURRENTRX
OS_AXIS: 083
OS_CYLINDER: -0.25
OD_SPHERE: -3.75
OS_OVR_VA: 20/
OD_AXIS: 129
OS_SPHERE: -3.50
OD_OVR_VA: 20/
OD_CYLINDER: -0.25

## 2022-11-16 ASSESSMENT — REFRACTION_AUTOREFRACTION
OS_SPHERE: -3.25
OS_CYLINDER: -0.25
OD_SPHERE: -3.75
OS_AXIS: 080

## 2022-11-16 ASSESSMENT — SPHEQUIV_DERIVED
OD_SPHEQUIV: -3.625
OS_SPHEQUIV: -3.625
OD_SPHEQUIV: -3.625
OS_SPHEQUIV: -3.625
OS_SPHEQUIV: -3.375

## 2022-11-16 ASSESSMENT — REFRACTION_MANIFEST
OD_SPHERE: -3.50
OS_AXIS: 080
OD_SPHERE: -3.50
OU_VA: 20/20
OD_AXIS: 130
OD_VA1: 20/20
OS_CYLINDER: -0.25
OS_CYLINDER: -0.25
OD_CYLINDER: -0.25
OS_VA1: 20/20
OD_VA1: 20/20
OS_AXIS: 080
OS_VA1: 20/20
OS_SPHERE: -3.50
OD_AXIS: 130
OD_CYLINDER: -0.25
OS_SPHERE: -3.50

## 2022-11-16 ASSESSMENT — VISUAL ACUITY
OD_BCVA: 20/20-1
OS_BCVA: 20/20

## 2022-11-16 ASSESSMENT — AXIALLENGTH_DERIVED
OS_AL: 24.7357
OD_AL: 24.7357
OD_AL: 24.7357
OS_AL: 24.7357
OS_AL: 24.6291

## 2022-11-16 ASSESSMENT — KERATOMETRY
OS_K2POWER_DIOPTERS: 44.50
OD_AXISANGLE_DEGREES: 090
OD_K2POWER_DIOPTERS: 44.50
OS_AXISANGLE_DEGREES: 141
OD_K1POWER_DIOPTERS: 44.25
OS_K1POWER_DIOPTERS: 44.25

## 2022-12-06 ENCOUNTER — OFFICE (OUTPATIENT)
Dept: URBAN - METROPOLITAN AREA CLINIC 112 | Facility: CLINIC | Age: 57
Setting detail: OPHTHALMOLOGY
End: 2022-12-06
Payer: COMMERCIAL

## 2022-12-06 DIAGNOSIS — H10.413: ICD-10-CM

## 2022-12-06 PROBLEM — H01.004 BLEPHARITIS; RIGHT UPPER LID, LEFT UPPER LID: Status: ACTIVE | Noted: 2022-10-11

## 2022-12-06 PROBLEM — H16.221 DRY EYE SYNDROME K SICCA; RIGHT EYE, LEFT EYE, BOTH EYES: Status: ACTIVE | Noted: 2022-10-11

## 2022-12-06 PROBLEM — H01.001 BLEPHARITIS; RIGHT UPPER LID, LEFT UPPER LID: Status: ACTIVE | Noted: 2022-10-11

## 2022-12-06 PROBLEM — H16.223 DRY EYE SYNDROME K SICCA; RIGHT EYE, LEFT EYE, BOTH EYES: Status: ACTIVE | Noted: 2022-10-11

## 2022-12-06 PROBLEM — H16.222 DRY EYE SYNDROME K SICCA; RIGHT EYE, LEFT EYE, BOTH EYES: Status: ACTIVE | Noted: 2022-10-11

## 2022-12-06 PROBLEM — H35.033 HYPERTENSIVE RETINOPATHY; BOTH EYES: Status: ACTIVE | Noted: 2022-10-11

## 2022-12-06 PROCEDURE — 99213 OFFICE O/P EST LOW 20 MIN: CPT | Performed by: OPHTHALMOLOGY

## 2022-12-06 ASSESSMENT — CONFRONTATIONAL VISUAL FIELD TEST (CVF)
OS_FINDINGS: FULL
OD_FINDINGS: FULL

## 2022-12-06 ASSESSMENT — LID EXAM ASSESSMENTS
OD_BLEPHARITIS: 1+ 2+
OS_BLEPHARITIS: 1+ 2+

## 2022-12-06 ASSESSMENT — TONOMETRY
OS_IOP_MMHG: 18
OD_IOP_MMHG: 18

## 2022-12-06 ASSESSMENT — TEAR BREAK UP TIME (TBUT)
OD_TBUT: 2+ 3+
OS_TBUT: 2+

## 2022-12-08 ASSESSMENT — REFRACTION_MANIFEST
OS_AXIS: 080
OD_VA1: 20/20
OS_VA1: 20/20
OS_CYLINDER: -0.25
OD_SPHERE: -3.50
OS_CYLINDER: -0.25
OD_CYLINDER: -0.25
OD_SPHERE: -3.50
OS_SPHERE: -3.50
OS_AXIS: 080
OU_VA: 20/20
OS_SPHERE: -3.50
OD_VA1: 20/20
OS_VA1: 20/20
OD_AXIS: 130
OD_AXIS: 130
OD_CYLINDER: -0.25

## 2022-12-08 ASSESSMENT — REFRACTION_CURRENTRX
OS_CYLINDER: -0.25
OS_OVR_VA: 20/
OD_AXIS: 129
OS_AXIS: 083
OD_OVR_VA: 20/
OS_SPHERE: -3.50
OD_SPHERE: -3.75
OD_CYLINDER: -0.25

## 2022-12-08 ASSESSMENT — AXIALLENGTH_DERIVED
OD_AL: 24.8943
OS_AL: 24.6853
OD_AL: 24.7863
OD_AL: 24.7863

## 2022-12-08 ASSESSMENT — SPHEQUIV_DERIVED
OS_SPHEQUIV: -3.625
OD_SPHEQUIV: -3.875
OD_SPHEQUIV: -3.625
OS_SPHEQUIV: -3.625
OD_SPHEQUIV: -3.625
OS_SPHEQUIV: -3.625

## 2022-12-08 ASSESSMENT — KERATOMETRY
OS_K1POWER_DIOPTERS: 44.50
OS_K2POWER_DIOPTERS: 44.50
OD_K1POWER_DIOPTERS: 44.25
OD_AXISANGLE_DEGREES: 090
OD_K2POWER_DIOPTERS: 44.25
OS_AXISANGLE_DEGREES: 090

## 2022-12-08 ASSESSMENT — REFRACTION_AUTOREFRACTION
OS_CYLINDER: -0.25
OD_CYLINDER: -0.25
OD_AXIS: 151
OS_SPHERE: -3.50
OS_AXIS: 059
OD_SPHERE: -3.75

## 2022-12-08 ASSESSMENT — VISUAL ACUITY
OS_BCVA: 20/25-1
OD_BCVA: 20/20-1

## 2022-12-13 ENCOUNTER — OFFICE (OUTPATIENT)
Dept: URBAN - METROPOLITAN AREA CLINIC 114 | Facility: CLINIC | Age: 57
Setting detail: OPHTHALMOLOGY
End: 2022-12-13
Payer: COMMERCIAL

## 2022-12-13 ENCOUNTER — RX ONLY (RX ONLY)
Age: 57
End: 2022-12-13

## 2022-12-13 DIAGNOSIS — H52.13: ICD-10-CM

## 2022-12-13 DIAGNOSIS — H16.221: ICD-10-CM

## 2022-12-13 DIAGNOSIS — H16.223: ICD-10-CM

## 2022-12-13 DIAGNOSIS — H16.222: ICD-10-CM

## 2022-12-13 PROCEDURE — 92012 INTRM OPH EXAM EST PATIENT: CPT | Performed by: OPHTHALMOLOGY

## 2022-12-13 PROCEDURE — 83861 MICROFLUID ANALY TEARS: CPT | Performed by: OPHTHALMOLOGY

## 2022-12-13 ASSESSMENT — REFRACTION_CURRENTRX
OD_CYLINDER: -0.25
OS_OVR_VA: 20/
OS_CYLINDER: -0.25
OS_SPHERE: -3.50
OD_AXIS: 129
OD_OVR_VA: 20/
OD_SPHERE: -3.75
OS_AXIS: 083

## 2022-12-13 ASSESSMENT — REFRACTION_MANIFEST
OD_SPHERE: -3.50
OD_VA1: 20/20
OU_VA: 20/20
OD_VA1: 20/20
OS_CYLINDER: -0.25
OD_AXIS: 130
OD_CYLINDER: -0.25
OS_SPHERE: -3.50
OS_SPHERE: -3.50
OD_CYLINDER: -0.25
OS_AXIS: 080
OS_VA1: 20/20
OD_SPHERE: -3.50
OS_AXIS: 080
OS_CYLINDER: -0.25
OD_AXIS: 130
OS_VA1: 20/20

## 2022-12-13 ASSESSMENT — AXIALLENGTH_DERIVED
OS_AL: 24.6853
OS_AL: 24.6853
OD_AL: 24.7863
OD_AL: 24.8943
OD_AL: 24.7863
OS_AL: 24.6853

## 2022-12-13 ASSESSMENT — TEAR BREAK UP TIME (TBUT)
OD_TBUT: 2+ 3+
OS_TBUT: 2+

## 2022-12-13 ASSESSMENT — VISUAL ACUITY
OD_BCVA: 20/20-1
OS_BCVA: 20/25-1

## 2022-12-13 ASSESSMENT — REFRACTION_AUTOREFRACTION
OS_SPHERE: -3.50
OD_SPHERE: -3.75
OD_AXIS: 151
OD_CYLINDER: -0.25
OS_CYLINDER: -0.25
OS_AXIS: 059

## 2022-12-13 ASSESSMENT — KERATOMETRY
OD_K2POWER_DIOPTERS: 44.25
OS_K2POWER_DIOPTERS: 44.50
OS_AXISANGLE_DEGREES: 090
OS_K1POWER_DIOPTERS: 44.50
OD_AXISANGLE_DEGREES: 090
OD_K1POWER_DIOPTERS: 44.25

## 2022-12-13 ASSESSMENT — SPHEQUIV_DERIVED
OS_SPHEQUIV: -3.625
OD_SPHEQUIV: -3.625
OD_SPHEQUIV: -3.625
OD_SPHEQUIV: -3.875

## 2022-12-13 ASSESSMENT — LID EXAM ASSESSMENTS
OS_BLEPHARITIS: 1+ 2+
OD_BLEPHARITIS: 1+ 2+

## 2022-12-13 ASSESSMENT — TONOMETRY
OS_IOP_MMHG: 17
OD_IOP_MMHG: 17

## 2022-12-19 ENCOUNTER — OFFICE (OUTPATIENT)
Dept: URBAN - METROPOLITAN AREA CLINIC 94 | Facility: CLINIC | Age: 57
Setting detail: OPHTHALMOLOGY
End: 2022-12-19
Payer: COMMERCIAL

## 2022-12-19 DIAGNOSIS — Z20.822: ICD-10-CM

## 2022-12-19 DIAGNOSIS — Z01.812: ICD-10-CM

## 2022-12-19 PROBLEM — H52.13 LASIK EVALUATION; BOTH EYES: Status: ACTIVE | Noted: 2022-11-08

## 2022-12-19 PROCEDURE — 99211 OFF/OP EST MAY X REQ PHY/QHP: CPT | Performed by: OPHTHALMOLOGY

## 2022-12-19 ASSESSMENT — AXIALLENGTH_DERIVED
OS_AL: 24.6853
OD_AL: 24.8943
OS_AL: 24.6853
OD_AL: 24.7863
OD_AL: 24.7863
OS_AL: 24.6853

## 2022-12-19 ASSESSMENT — KERATOMETRY
OS_AXISANGLE_DEGREES: 090
OD_K2POWER_DIOPTERS: 44.25
OS_K2POWER_DIOPTERS: 44.50
OS_K1POWER_DIOPTERS: 44.50
OD_AXISANGLE_DEGREES: 090
OD_K1POWER_DIOPTERS: 44.25

## 2022-12-19 ASSESSMENT — REFRACTION_MANIFEST
OD_AXIS: 130
OD_SPHERE: -3.50
OS_SPHERE: -3.50
OD_VA1: 20/20
OS_CYLINDER: -0.25
OD_SPHERE: -3.50
OS_AXIS: 080
OD_CYLINDER: -0.25
OS_VA1: 20/20
OU_VA: 20/20
OD_CYLINDER: -0.25
OS_AXIS: 080
OS_SPHERE: -3.50
OD_VA1: 20/20
OS_VA1: 20/20
OD_AXIS: 130
OS_CYLINDER: -0.25

## 2022-12-19 ASSESSMENT — REFRACTION_CURRENTRX
OS_OVR_VA: 20/
OS_SPHERE: -3.50
OS_AXIS: 083
OD_AXIS: 129
OS_CYLINDER: -0.25
OD_OVR_VA: 20/
OD_CYLINDER: -0.25
OD_SPHERE: -3.75

## 2022-12-19 ASSESSMENT — REFRACTION_AUTOREFRACTION
OD_AXIS: 151
OS_CYLINDER: -0.25
OS_SPHERE: -3.50
OD_SPHERE: -3.75
OD_CYLINDER: -0.25
OS_AXIS: 059

## 2022-12-19 ASSESSMENT — SPHEQUIV_DERIVED
OD_SPHEQUIV: -3.875
OS_SPHEQUIV: -3.625
OD_SPHEQUIV: -3.625
OS_SPHEQUIV: -3.625
OD_SPHEQUIV: -3.625
OS_SPHEQUIV: -3.625

## 2022-12-19 ASSESSMENT — TEAR BREAK UP TIME (TBUT)
OD_TBUT: 2+ 3+
OS_TBUT: 2+

## 2022-12-19 ASSESSMENT — VISUAL ACUITY
OD_BCVA: 20/20-1
OS_BCVA: 20/25-1

## 2022-12-22 ENCOUNTER — OTHER LOCATION (OUTPATIENT)
Dept: URBAN - METROPOLITAN AREA LASIK CENTER 6 | Facility: LASIK CENTER | Age: 57
Setting detail: OPHTHALMOLOGY
End: 2022-12-22

## 2022-12-22 DIAGNOSIS — H52.13: ICD-10-CM

## 2022-12-22 PROCEDURE — 65760 KERATOMILEUSIS: CPT | Performed by: OPHTHALMOLOGY

## 2022-12-22 ASSESSMENT — SPHEQUIV_DERIVED
OS_SPHEQUIV: -3.625
OD_SPHEQUIV: -3.625
OD_SPHEQUIV: -3.625
OD_SPHEQUIV: -3.875
OS_SPHEQUIV: -3.625
OS_SPHEQUIV: -3.625

## 2022-12-22 ASSESSMENT — REFRACTION_MANIFEST
OS_CYLINDER: -0.25
OD_SPHERE: -3.50
OS_AXIS: 080
OD_CYLINDER: -0.25
OD_CYLINDER: -0.25
OS_CYLINDER: -0.25
OS_SPHERE: -3.50
OD_AXIS: 130
OD_SPHERE: -3.50
OS_SPHERE: -3.50
OD_VA1: 20/20
OD_AXIS: 130
OS_VA1: 20/20
OS_AXIS: 080
OS_VA1: 20/20
OD_VA1: 20/20
OU_VA: 20/20

## 2022-12-22 ASSESSMENT — AXIALLENGTH_DERIVED
OD_AL: 24.7863
OS_AL: 24.6853
OD_AL: 24.7863
OD_AL: 24.8943

## 2022-12-22 ASSESSMENT — KERATOMETRY
OD_AXISANGLE_DEGREES: 090
OS_K1POWER_DIOPTERS: 44.50
OS_K2POWER_DIOPTERS: 44.50
OS_AXISANGLE_DEGREES: 090
OD_K1POWER_DIOPTERS: 44.25
OD_K2POWER_DIOPTERS: 44.25

## 2022-12-22 ASSESSMENT — REFRACTION_AUTOREFRACTION
OS_AXIS: 059
OD_CYLINDER: -0.25
OS_CYLINDER: -0.25
OS_SPHERE: -3.50
OD_AXIS: 151
OD_SPHERE: -3.75

## 2022-12-22 ASSESSMENT — REFRACTION_CURRENTRX
OS_AXIS: 083
OD_CYLINDER: -0.25
OS_SPHERE: -3.50
OD_SPHERE: -3.75
OS_CYLINDER: -0.25
OS_OVR_VA: 20/
OD_AXIS: 129
OD_OVR_VA: 20/

## 2022-12-22 ASSESSMENT — VISUAL ACUITY
OS_BCVA: 20/25-1
OD_BCVA: 20/20-1

## 2022-12-23 ENCOUNTER — RX ONLY (RX ONLY)
Age: 57
End: 2022-12-23

## 2022-12-23 ENCOUNTER — OFFICE (OUTPATIENT)
Dept: URBAN - METROPOLITAN AREA CLINIC 114 | Facility: CLINIC | Age: 57
Setting detail: OPHTHALMOLOGY
End: 2022-12-23
Payer: COMMERCIAL

## 2022-12-23 DIAGNOSIS — H16.223: ICD-10-CM

## 2022-12-23 DIAGNOSIS — H52.13: ICD-10-CM

## 2022-12-23 PROCEDURE — 92071 CONTACT LENS FITTING FOR TX: CPT | Performed by: OPHTHALMOLOGY

## 2022-12-23 PROCEDURE — 99024 POSTOP FOLLOW-UP VISIT: CPT | Performed by: OPHTHALMOLOGY

## 2022-12-23 ASSESSMENT — KERATOMETRY
OD_K1POWER_DIOPTERS: 39.75
OD_K2POWER_DIOPTERS: 40.00
OD_AXISANGLE_DEGREES: 051
OS_AXISANGLE_DEGREES: 159
OS_K1POWER_DIOPTERS: 40.75
OS_K2POWER_DIOPTERS: 41.25

## 2022-12-23 ASSESSMENT — REFRACTION_MANIFEST
OS_CYLINDER: -0.25
OS_CYLINDER: -0.25
OD_VA1: 20/20
OS_SPHERE: -3.50
OS_VA1: 20/20
OD_AXIS: 130
OS_AXIS: 080
OD_CYLINDER: -0.25
OU_VA: 20/20
OD_SPHERE: -3.50
OD_SPHERE: -3.50
OD_AXIS: 130
OS_SPHERE: -3.50
OD_VA1: 20/20
OD_CYLINDER: -0.25
OS_AXIS: 080
OS_VA1: 20/20

## 2022-12-23 ASSESSMENT — AXIALLENGTH_DERIVED
OD_AL: 26.6968
OS_AL: 26.178
OS_AL: 24.2865
OD_AL: 24.6791
OS_AL: 26.178
OD_AL: 26.6968

## 2022-12-23 ASSESSMENT — CONFRONTATIONAL VISUAL FIELD TEST (CVF)
OS_FINDINGS: FULL
OD_FINDINGS: FULL

## 2022-12-23 ASSESSMENT — REFRACTION_CURRENTRX
OS_CYLINDER: -0.25
OD_AXIS: 129
OS_AXIS: 083
OD_OVR_VA: 20/
OS_SPHERE: -3.50
OD_SPHERE: -3.75
OD_CYLINDER: -0.25
OS_OVR_VA: 20/

## 2022-12-23 ASSESSMENT — LID EXAM ASSESSMENTS
OS_BLEPHARITIS: 1+ 2+
OD_BLEPHARITIS: 1+ 2+

## 2022-12-23 ASSESSMENT — REFRACTION_AUTOREFRACTION
OD_CYLINDER: -1.00
OS_SPHERE: +0.75
OD_SPHERE: +1.25
OD_AXIS: 130
OS_CYLINDER: -0.25
OS_AXIS: 069

## 2022-12-23 ASSESSMENT — SPHEQUIV_DERIVED
OD_SPHEQUIV: 0.75
OS_SPHEQUIV: -3.625
OD_SPHEQUIV: -3.625
OD_SPHEQUIV: -3.625
OS_SPHEQUIV: 0.625
OS_SPHEQUIV: -3.625

## 2022-12-23 ASSESSMENT — SUPERFICIAL PUNCTATE KERATITIS (SPK)
OS_SPK: 2+ 3+
OD_SPK: 2+ 3+

## 2022-12-23 ASSESSMENT — VISUAL ACUITY
OD_BCVA: 20/20
OS_BCVA: 20/30

## 2022-12-23 ASSESSMENT — TEAR BREAK UP TIME (TBUT)
OS_TBUT: 2+ 3+
OD_TBUT: 2+ 3+

## 2022-12-24 ENCOUNTER — OFFICE (OUTPATIENT)
Dept: URBAN - METROPOLITAN AREA CLINIC 94 | Facility: CLINIC | Age: 57
Setting detail: OPHTHALMOLOGY
End: 2022-12-24
Payer: COMMERCIAL

## 2022-12-24 DIAGNOSIS — H52.13: ICD-10-CM

## 2022-12-24 DIAGNOSIS — H16.223: ICD-10-CM

## 2022-12-24 PROCEDURE — 99024 POSTOP FOLLOW-UP VISIT: CPT | Performed by: REGISTERED NURSE

## 2022-12-24 ASSESSMENT — AXIALLENGTH_DERIVED
OS_AL: 26.178
OD_AL: 26.6968
OD_AL: 24.573
OS_AL: 26.178
OS_AL: 23.9808
OD_AL: 26.6968

## 2022-12-24 ASSESSMENT — REFRACTION_CURRENTRX
OS_CYLINDER: -0.25
OD_CYLINDER: -0.25
OS_OVR_VA: 20/
OD_SPHERE: -3.75
OD_OVR_VA: 20/
OD_AXIS: 129
OS_AXIS: 083
OS_SPHERE: -3.50

## 2022-12-24 ASSESSMENT — VISUAL ACUITY
OS_BCVA: 20/25
OD_BCVA: 20/20

## 2022-12-24 ASSESSMENT — SUPERFICIAL PUNCTATE KERATITIS (SPK)
OS_SPK: 2+ 3+
OD_SPK: 2+ 3+

## 2022-12-24 ASSESSMENT — SPHEQUIV_DERIVED
OS_SPHEQUIV: -3.625
OD_SPHEQUIV: -3.625
OS_SPHEQUIV: -3.625
OD_SPHEQUIV: -3.625
OS_SPHEQUIV: 1.375
OD_SPHEQUIV: 1

## 2022-12-24 ASSESSMENT — REFRACTION_MANIFEST
OS_CYLINDER: -0.25
OD_VA1: 20/20
OD_SPHERE: -3.50
OD_CYLINDER: -0.25
OS_VA1: 20/20
OS_SPHERE: -3.50
OD_AXIS: 130
OS_AXIS: 080
OS_VA1: 20/20
OD_VA1: 20/20
OS_CYLINDER: -0.25
OD_AXIS: 130
OU_VA: 20/20
OS_AXIS: 080
OD_SPHERE: -3.50
OD_CYLINDER: -0.25
OS_SPHERE: -3.50

## 2022-12-24 ASSESSMENT — REFRACTION_AUTOREFRACTION
OD_SPHERE: +1.25
OS_SPHERE: +1.75
OD_AXIS: 111
OS_CYLINDER: -0.75
OD_CYLINDER: -0.50
OS_AXIS: 090

## 2022-12-24 ASSESSMENT — KERATOMETRY
OS_K2POWER_DIOPTERS: 41.25
OD_AXISANGLE_DEGREES: 051
OS_AXISANGLE_DEGREES: 159
OD_K1POWER_DIOPTERS: 39.75
OD_K2POWER_DIOPTERS: 40.00
OS_K1POWER_DIOPTERS: 40.75

## 2022-12-24 ASSESSMENT — LID EXAM ASSESSMENTS
OS_BLEPHARITIS: 1+ 2+
OD_BLEPHARITIS: 1+ 2+

## 2022-12-24 ASSESSMENT — TEAR BREAK UP TIME (TBUT)
OS_TBUT: 2+ 3+
OD_TBUT: 2+ 3+

## 2022-12-24 ASSESSMENT — CONFRONTATIONAL VISUAL FIELD TEST (CVF)
OD_FINDINGS: FULL
OS_FINDINGS: FULL

## 2022-12-29 ENCOUNTER — OFFICE (OUTPATIENT)
Dept: URBAN - METROPOLITAN AREA CLINIC 94 | Facility: CLINIC | Age: 57
Setting detail: OPHTHALMOLOGY
End: 2022-12-29
Payer: COMMERCIAL

## 2022-12-29 DIAGNOSIS — H10.411: ICD-10-CM

## 2022-12-29 DIAGNOSIS — H16.223: ICD-10-CM

## 2022-12-29 DIAGNOSIS — H52.13: ICD-10-CM

## 2022-12-29 PROCEDURE — 99024 POSTOP FOLLOW-UP VISIT: CPT | Performed by: REGISTERED NURSE

## 2022-12-29 ASSESSMENT — REFRACTION_CURRENTRX
OS_OVR_VA: 20/
OS_SPHERE: -3.50
OS_AXIS: 083
OD_SPHERE: -3.75
OD_OVR_VA: 20/
OD_AXIS: 129
OS_CYLINDER: -0.25
OD_CYLINDER: -0.25

## 2022-12-29 ASSESSMENT — REFRACTION_MANIFEST
OD_SPHERE: -3.50
OS_AXIS: 080
OD_CYLINDER: -0.25
OS_SPHERE: -3.50
OS_VA1: 20/20
OD_VA1: 20/20
OS_SPHERE: -3.50
OD_AXIS: 130
OS_VA1: 20/20
OS_CYLINDER: -0.25
OU_VA: 20/20
OS_CYLINDER: -0.25
OD_SPHERE: -3.50
OS_AXIS: 080
OD_VA1: 20/20
OD_AXIS: 130
OD_CYLINDER: -0.25

## 2022-12-29 ASSESSMENT — AXIALLENGTH_DERIVED
OS_AL: 26.4637
OD_AL: 26.5798
OD_AL: 24.632
OS_AL: 26.4637
OD_AL: 26.5798
OS_AL: 24.4274

## 2022-12-29 ASSESSMENT — SUPERFICIAL PUNCTATE KERATITIS (SPK)
OS_SPK: 2+ 3+
OD_SPK: 2+ 3+

## 2022-12-29 ASSESSMENT — TEAR BREAK UP TIME (TBUT)
OD_TBUT: 2+ 3+
OS_TBUT: 2+ 3+

## 2022-12-29 ASSESSMENT — REFRACTION_AUTOREFRACTION
OS_SPHERE: +1.00
OS_AXIS: 169
OS_CYLINDER: -0.25
OD_SPHERE: +1.00
OD_CYLINDER: -0.75
OD_AXIS: 101

## 2022-12-29 ASSESSMENT — KERATOMETRY
OD_K2POWER_DIOPTERS: 40.25
OD_K1POWER_DIOPTERS: 40.00
OS_K1POWER_DIOPTERS: 40.25
OS_AXISANGLE_DEGREES: 086
OS_K2POWER_DIOPTERS: 40.50
OD_AXISANGLE_DEGREES: 014

## 2022-12-29 ASSESSMENT — CONFRONTATIONAL VISUAL FIELD TEST (CVF)
OD_FINDINGS: FULL
OS_FINDINGS: FULL

## 2022-12-29 ASSESSMENT — SPHEQUIV_DERIVED
OD_SPHEQUIV: 0.625
OD_SPHEQUIV: -3.625
OS_SPHEQUIV: 0.875
OS_SPHEQUIV: -3.625
OD_SPHEQUIV: -3.625
OS_SPHEQUIV: -3.625

## 2022-12-29 ASSESSMENT — LID EXAM ASSESSMENTS
OS_BLEPHARITIS: 1+ 2+
OD_BLEPHARITIS: 1+ 2+

## 2022-12-29 ASSESSMENT — VISUAL ACUITY
OD_BCVA: 20/40-1
OS_BCVA: 20/40

## 2023-01-03 ENCOUNTER — RX ONLY (RX ONLY)
Age: 58
End: 2023-01-03

## 2023-01-03 ENCOUNTER — OFFICE (OUTPATIENT)
Dept: URBAN - METROPOLITAN AREA CLINIC 112 | Facility: CLINIC | Age: 58
Setting detail: OPHTHALMOLOGY
End: 2023-01-03
Payer: COMMERCIAL

## 2023-01-03 DIAGNOSIS — H17.9: ICD-10-CM

## 2023-01-03 DIAGNOSIS — H16.223: ICD-10-CM

## 2023-01-03 PROBLEM — H52.13 BILATERAL INTRALASE TODAY; BOTH EYES: Status: ACTIVE | Noted: 2022-12-22

## 2023-01-03 PROBLEM — H10.411 CONJUNCTIVITIS, GIANT PAPILLARY CHRONIC; RIGHT EYE: Status: RESOLVED | Noted: 2022-12-06 | Resolved: 2023-01-03

## 2023-01-03 PROCEDURE — 99024 POSTOP FOLLOW-UP VISIT: CPT | Performed by: OPHTHALMOLOGY

## 2023-01-03 ASSESSMENT — REFRACTION_MANIFEST
OD_CYLINDER: -0.25
OS_AXIS: 080
OS_SPHERE: -3.50
OS_AXIS: 080
OS_VA1: 20/20
OD_CYLINDER: -0.50
OU_VA: 20/20
OD_AXIS: 110
OS_CYLINDER: -0.25
OD_VA1: 20/20
OD_AXIS: 130
OD_AXIS: 130
OD_VA1: 20/20
OS_VA1: 20/20
OS_AXIS: 090
OD_CYLINDER: -0.25
OD_SPHERE: -3.50
OS_VA1: 20/20-1
OS_SPHERE: +1.00
OD_VA1: 20/20
OD_SPHERE: -3.50
OS_CYLINDER: -0.25
OD_SPHERE: +1.00
OS_SPHERE: -3.50
OS_CYLINDER: -0.25

## 2023-01-03 ASSESSMENT — AXIALLENGTH_DERIVED
OS_AL: 26.5216
OD_AL: 24.5293
OS_AL: 26.5216
OD_AL: 24.5293
OS_AL: 24.3723
OS_AL: 24.4767
OD_AL: 26.5216
OD_AL: 26.5216

## 2023-01-03 ASSESSMENT — REFRACTION_CURRENTRX
OS_AXIS: 083
OS_OVR_VA: 20/
OD_AXIS: 129
OS_CYLINDER: -0.25
OD_CYLINDER: -0.25
OD_SPHERE: -3.75
OS_SPHERE: -3.50
OD_OVR_VA: 20/

## 2023-01-03 ASSESSMENT — REFRACTION_AUTOREFRACTION
OS_SPHERE: +1.25
OS_AXIS: 089
OS_CYLINDER: -0.25
OD_SPHERE: +1.00
OD_CYLINDER: -0.50
OD_AXIS: 112

## 2023-01-03 ASSESSMENT — SPHEQUIV_DERIVED
OS_SPHEQUIV: 1.125
OD_SPHEQUIV: -3.625
OS_SPHEQUIV: 0.875
OD_SPHEQUIV: -3.625
OS_SPHEQUIV: -3.625
OD_SPHEQUIV: 0.75
OS_SPHEQUIV: -3.625
OD_SPHEQUIV: 0.75

## 2023-01-03 ASSESSMENT — LID EXAM ASSESSMENTS
OD_BLEPHARITIS: 1+ 2+
OS_BLEPHARITIS: 1+ 2+

## 2023-01-03 ASSESSMENT — SUPERFICIAL PUNCTATE KERATITIS (SPK)
OS_SPK: 2+ 3+
OD_SPK: 2+ 3+

## 2023-01-03 ASSESSMENT — KERATOMETRY
OD_AXISANGLE_DEGREES: 023
OS_AXISANGLE_DEGREES: 090
OS_K2POWER_DIOPTERS: 40.25
OD_K1POWER_DIOPTERS: 40.00
OS_K1POWER_DIOPTERS: 40.25
OD_K2POWER_DIOPTERS: 40.50

## 2023-01-03 ASSESSMENT — CONFRONTATIONAL VISUAL FIELD TEST (CVF)
OS_FINDINGS: FULL
OD_FINDINGS: FULL

## 2023-01-03 ASSESSMENT — TEAR BREAK UP TIME (TBUT)
OS_TBUT: 2+ 3+
OD_TBUT: 2+ 3+

## 2023-01-03 ASSESSMENT — VISUAL ACUITY
OD_BCVA: 20/50+1
OS_BCVA: 20/40

## 2023-01-17 ENCOUNTER — OFFICE (OUTPATIENT)
Dept: URBAN - METROPOLITAN AREA CLINIC 112 | Facility: CLINIC | Age: 58
Setting detail: OPHTHALMOLOGY
End: 2023-01-17
Payer: COMMERCIAL

## 2023-01-17 DIAGNOSIS — H17.9: ICD-10-CM

## 2023-01-17 DIAGNOSIS — H16.223: ICD-10-CM

## 2023-01-17 PROCEDURE — 99024 POSTOP FOLLOW-UP VISIT: CPT | Performed by: OPHTHALMOLOGY

## 2023-01-17 ASSESSMENT — REFRACTION_MANIFEST
OD_AXIS: 130
OD_VA1: 20/20
OD_CYLINDER: SPH
OS_CYLINDER: -0.25
OD_VA1: 20/20
OS_AXIS: 080
OS_VA1: 20/20
OD_SPHERE: +1.25
OD_AXIS: 130
OS_SPHERE: +1.00
OD_CYLINDER: -0.50
OD_VA1: 20/20
OS_AXIS: 090
OS_SPHERE: -3.50
OS_SPHERE: +1.25
OU_VA: 20/20
OS_CYLINDER: SPH
OD_CYLINDER: -0.25
OD_SPHERE: -3.50
OD_AXIS: 110
OD_CYLINDER: -0.25
OS_CYLINDER: -0.25
OS_CYLINDER: -0.25
OS_VA1: 20/20
OD_SPHERE: +1.00
OS_AXIS: 080
OS_SPHERE: -3.50
OD_SPHERE: -3.50
OD_VA1: 20/20
OS_VA1: 20/20
OS_VA1: 20/20-1

## 2023-01-17 ASSESSMENT — REFRACTION_CURRENTRX
OS_SPHERE: -3.50
OD_SPHERE: -3.75
OS_CYLINDER: -0.25
OS_OVR_VA: 20/
OD_CYLINDER: -0.25
OD_AXIS: 129
OS_AXIS: 083
OD_OVR_VA: 20/

## 2023-01-17 ASSESSMENT — TEAR BREAK UP TIME (TBUT)
OS_TBUT: 2+
OD_TBUT: 2+

## 2023-01-17 ASSESSMENT — AXIALLENGTH_DERIVED
OS_AL: 26.4637
OS_AL: 24.4274
OD_AL: 24.3753
OS_AL: 26.4637
OD_AL: 24.4797
OS_AL: 24.2718
OD_AL: 26.4637
OD_AL: 26.4637

## 2023-01-17 ASSESSMENT — SPHEQUIV_DERIVED
OD_SPHEQUIV: 1
OS_SPHEQUIV: 1.25
OS_SPHEQUIV: -3.625
OD_SPHEQUIV: -3.625
OD_SPHEQUIV: -3.625
OS_SPHEQUIV: 0.875
OS_SPHEQUIV: -3.625
OD_SPHEQUIV: 0.75

## 2023-01-17 ASSESSMENT — KERATOMETRY
OS_K2POWER_DIOPTERS: 40.50
OS_K1POWER_DIOPTERS: 40.25
OD_K2POWER_DIOPTERS: 40.50
OD_AXISANGLE_DEGREES: 058
OS_AXISANGLE_DEGREES: 081
OD_K1POWER_DIOPTERS: 40.25

## 2023-01-17 ASSESSMENT — VISUAL ACUITY
OS_BCVA: 20/50+1
OD_BCVA: 20/40-1

## 2023-01-17 ASSESSMENT — REFRACTION_AUTOREFRACTION
OD_SPHERE: +1.00
OS_SPHERE: +1.25
OD_AXIS: 000
OS_AXIS: 000
OD_CYLINDER: 0.00
OS_CYLINDER: 0.00

## 2023-01-17 ASSESSMENT — CONFRONTATIONAL VISUAL FIELD TEST (CVF)
OD_FINDINGS: FULL
OS_FINDINGS: FULL

## 2023-01-17 ASSESSMENT — LID EXAM ASSESSMENTS
OS_BLEPHARITIS: 1+ 2+
OD_BLEPHARITIS: 1+ 2+

## 2023-02-14 ENCOUNTER — OFFICE (OUTPATIENT)
Dept: URBAN - METROPOLITAN AREA CLINIC 114 | Facility: CLINIC | Age: 58
Setting detail: OPHTHALMOLOGY
End: 2023-02-14
Payer: COMMERCIAL

## 2023-02-14 DIAGNOSIS — H25.13: ICD-10-CM

## 2023-02-14 DIAGNOSIS — H16.223: ICD-10-CM

## 2023-02-14 DIAGNOSIS — H35.373: ICD-10-CM

## 2023-02-14 DIAGNOSIS — H17.9: ICD-10-CM

## 2023-02-14 PROCEDURE — 92134 CPTRZ OPH DX IMG PST SGM RTA: CPT | Performed by: OPHTHALMOLOGY

## 2023-02-14 PROCEDURE — 99213 OFFICE O/P EST LOW 20 MIN: CPT | Performed by: OPHTHALMOLOGY

## 2023-02-14 ASSESSMENT — KERATOMETRY
OD_AXISANGLE_DEGREES: 090
OD_K1POWER_DIOPTERS: 40.25
OS_K1POWER_DIOPTERS: 40.25
OS_AXISANGLE_DEGREES: 080
OD_K2POWER_DIOPTERS: 40.25
OS_K2POWER_DIOPTERS: 41.00

## 2023-02-14 ASSESSMENT — TEAR BREAK UP TIME (TBUT)
OD_TBUT: 2+
OS_TBUT: 2+

## 2023-02-14 ASSESSMENT — CONFRONTATIONAL VISUAL FIELD TEST (CVF)
OS_FINDINGS: FULL
OD_FINDINGS: FULL

## 2023-02-14 ASSESSMENT — LID EXAM ASSESSMENTS
OD_BLEPHARITIS: 1+ 2+
OS_BLEPHARITIS: 1+ 2+

## 2023-02-14 ASSESSMENT — VISUAL ACUITY
OD_BCVA: 20/70-1
OS_BCVA: 20/70-1

## 2023-03-07 ENCOUNTER — OFFICE (OUTPATIENT)
Dept: URBAN - METROPOLITAN AREA CLINIC 112 | Facility: CLINIC | Age: 58
Setting detail: OPHTHALMOLOGY
End: 2023-03-07
Payer: COMMERCIAL

## 2023-03-07 DIAGNOSIS — H16.223: ICD-10-CM

## 2023-03-07 DIAGNOSIS — H25.13: ICD-10-CM

## 2023-03-07 DIAGNOSIS — H17.9: ICD-10-CM

## 2023-03-07 DIAGNOSIS — H35.373: ICD-10-CM

## 2023-03-07 PROCEDURE — 92012 INTRM OPH EXAM EST PATIENT: CPT | Performed by: OPHTHALMOLOGY

## 2023-03-07 PROCEDURE — 92025 CPTRIZED CORNEAL TOPOGRAPHY: CPT | Performed by: OPHTHALMOLOGY

## 2023-03-07 ASSESSMENT — CONFRONTATIONAL VISUAL FIELD TEST (CVF)
OS_FINDINGS: FULL
OD_FINDINGS: FULL

## 2023-03-07 ASSESSMENT — VISUAL ACUITY
OS_BCVA: 20/60
OD_BCVA: 20/80

## 2023-03-07 ASSESSMENT — KERATOMETRY
OS_K1POWER_DIOPTERS: 40.25
OD_K1POWER_DIOPTERS: 40.25
OD_AXISANGLE_DEGREES: 031
OS_K2POWER_DIOPTERS: 40.50
OS_AXISANGLE_DEGREES: 051
OD_K2POWER_DIOPTERS: 40.50

## 2023-03-07 ASSESSMENT — TEAR BREAK UP TIME (TBUT)
OS_TBUT: 2+
OD_TBUT: 2+

## 2023-03-07 ASSESSMENT — LID EXAM ASSESSMENTS
OD_BLEPHARITIS: 1+ 2+
OS_BLEPHARITIS: 1+ 2+

## 2023-03-07 ASSESSMENT — TONOMETRY
OD_IOP_MMHG: 13
OS_IOP_MMHG: 13

## 2023-03-09 NOTE — ED ADULT NURSE NOTE - EXTENSIONS OF SELF_ADULT
None [employer] Counseling:  I discussed with the patient the risks of mycophenolate mofetil including but not limited to infection/immunosuppression, GI upset, hypokalemia, hypercholesterolemia, bone marrow suppression, lymphoproliferative disorders, malignancy, GI ulceration/bleed/perforation, colitis, interstitial lung disease, kidney failure, progressive multifocal leukoencephalopathy, and birth defects.  The patient understands that monitoring is required including a baseline creatinine and regular CBC testing. In addition, patient must alert us immediately if symptoms of infection or other concerning signs are noted.

## 2023-04-04 ENCOUNTER — OFFICE (OUTPATIENT)
Dept: URBAN - METROPOLITAN AREA CLINIC 112 | Facility: CLINIC | Age: 58
Setting detail: OPHTHALMOLOGY
End: 2023-04-04
Payer: COMMERCIAL

## 2023-04-04 DIAGNOSIS — H16.223: ICD-10-CM

## 2023-04-04 DIAGNOSIS — H35.373: ICD-10-CM

## 2023-04-04 DIAGNOSIS — H17.9: ICD-10-CM

## 2023-04-04 DIAGNOSIS — H25.13: ICD-10-CM

## 2023-04-04 PROCEDURE — 99213 OFFICE O/P EST LOW 20 MIN: CPT | Performed by: OPHTHALMOLOGY

## 2023-04-04 ASSESSMENT — TONOMETRY
OD_IOP_MMHG: 11
OS_IOP_MMHG: 13

## 2023-04-04 ASSESSMENT — TEAR BREAK UP TIME (TBUT)
OD_TBUT: 2+
OS_TBUT: 2+

## 2023-04-04 ASSESSMENT — CONFRONTATIONAL VISUAL FIELD TEST (CVF)
OS_FINDINGS: FULL
OD_FINDINGS: FULL

## 2023-04-04 ASSESSMENT — LID EXAM ASSESSMENTS
OS_BLEPHARITIS: 1+ 2+
OD_BLEPHARITIS: 1+ 2+

## 2023-04-05 ASSESSMENT — VISUAL ACUITY
OS_BCVA: 20/60
OD_BCVA: 20/60

## 2023-04-05 ASSESSMENT — KERATOMETRY
OD_K2POWER_DIOPTERS: 41.50
OS_K2POWER_DIOPTERS: 40.25
OS_K1POWER_DIOPTERS: 40.00
OD_AXISANGLE_DEGREES: 173
OD_K1POWER_DIOPTERS: 40.00
OS_AXISANGLE_DEGREES: 010

## 2023-04-06 ENCOUNTER — ASC (OUTPATIENT)
Dept: URBAN - METROPOLITAN AREA SURGERY 8 | Facility: SURGERY | Age: 58
Setting detail: OPHTHALMOLOGY
End: 2023-04-06
Payer: COMMERCIAL

## 2023-04-06 DIAGNOSIS — H25.12: ICD-10-CM

## 2023-04-06 DIAGNOSIS — H52.212: ICD-10-CM

## 2023-04-06 PROCEDURE — FEMTO FEMTOSECOND LASER: Performed by: OPHTHALMOLOGY

## 2023-04-06 PROCEDURE — 66984 XCAPSL CTRC RMVL W/O ECP: CPT | Performed by: OPHTHALMOLOGY

## 2023-04-06 PROCEDURE — V2788P PANOPTIX: Performed by: OPHTHALMOLOGY

## 2023-04-07 ENCOUNTER — RX ONLY (RX ONLY)
Age: 58
End: 2023-04-07

## 2023-04-07 ENCOUNTER — OFFICE (OUTPATIENT)
Dept: URBAN - METROPOLITAN AREA CLINIC 94 | Facility: CLINIC | Age: 58
Setting detail: OPHTHALMOLOGY
End: 2023-04-07
Payer: COMMERCIAL

## 2023-04-07 DIAGNOSIS — Z96.1: ICD-10-CM

## 2023-04-07 PROCEDURE — 99024 POSTOP FOLLOW-UP VISIT: CPT | Performed by: REGISTERED NURSE

## 2023-04-07 ASSESSMENT — VISUAL ACUITY
OS_BCVA: 20/50
OD_BCVA: 20/30

## 2023-04-07 ASSESSMENT — REFRACTION_CURRENTRX
OD_OVR_VA: 20/
OS_CYLINDER: -0.25
OD_SPHERE: -3.75
OS_SPHERE: -3.50
OD_CYLINDER: -0.25
OS_AXIS: 083
OS_OVR_VA: 20/
OD_AXIS: 129

## 2023-04-07 ASSESSMENT — REFRACTION_MANIFEST
OS_CYLINDER: -0.25
OD_AXIS: 110
OD_VA1: 20/20
OS_VA1: 20/20-1
OS_SPHERE: +1.75
OS_AXIS: 090
OS_AXIS: 080
OS_SPHERE: -3.50
OS_VA2: 20/20
OS_VA1: 20/20
OS_VA1: 20/20
OS_SPHERE: +1.00
OD_VA1: 20/20
OD_CYLINDER: SPH
OD_SPHERE: +1.00
OS_CYLINDER: SPH
OD_CYLINDER: -0.50
OD_SPHERE: +1.25
OD_VA1: 20/20
OD_SPHERE: +1.50
OD_AXIS: 130
OD_CYLINDER: -0.25
OD_AXIS: 110
OD_VA1: 20/20
OU_VA: 20/20
OS_AXIS: 080
OS_SPHERE: +1.25
OS_CYLINDER: -0.25
OD_SPHERE: -3.50
OS_CYLINDER: -0.25
OD_VA2: 20/20
OS_VA1: 20/20
OD_CYLINDER: -0.50

## 2023-04-07 ASSESSMENT — KERATOMETRY
OS_AXISANGLE_DEGREES: 101
OS_K1POWER_DIOPTERS: 39.75
OS_K2POWER_DIOPTERS: 40.25
OD_K2POWER_DIOPTERS: 40.25
OD_AXISANGLE_DEGREES: 090
OD_K1POWER_DIOPTERS: 40.25

## 2023-04-07 ASSESSMENT — REFRACTION_AUTOREFRACTION
OS_AXIS: 044
OS_SPHERE: +0.50
OD_CYLINDER: -0.25
OD_SPHERE: +1.25
OD_AXIS: 112
OS_CYLINDER: -0.25

## 2023-04-07 ASSESSMENT — AXIALLENGTH_DERIVED
OD_AL: 24.3723
OD_AL: 24.5293
OS_AL: 24.2629
OD_AL: 24.3205
OD_AL: 26.5216
OS_AL: 24.7892
OS_AL: 26.6382
OS_AL: 24.576

## 2023-04-07 ASSESSMENT — CONFRONTATIONAL VISUAL FIELD TEST (CVF)
OD_FINDINGS: FULL
OS_FINDINGS: FULL

## 2023-04-07 ASSESSMENT — SPHEQUIV_DERIVED
OS_SPHEQUIV: 1.625
OS_SPHEQUIV: 0.375
OS_SPHEQUIV: -3.625
OD_SPHEQUIV: 1.25
OD_SPHEQUIV: 0.75
OS_SPHEQUIV: 0.875
OD_SPHEQUIV: 1.125
OD_SPHEQUIV: -3.625

## 2023-04-07 ASSESSMENT — TEAR BREAK UP TIME (TBUT)
OD_TBUT: 2+
OS_TBUT: 2+

## 2023-04-07 ASSESSMENT — LID EXAM ASSESSMENTS
OD_BLEPHARITIS: 1+ 2+
OS_BLEPHARITIS: 1+ 2+

## 2023-04-07 ASSESSMENT — CORNEAL EDEMA - MICROCYSTIC EPITHELIAL EDEMA (MCE): OD_MCE: 1+

## 2023-04-07 ASSESSMENT — CORNEAL EDEMA - FOLDS/STRIAE: OD_FOLDSSTRIAE: T

## 2023-04-10 ENCOUNTER — OFFICE (OUTPATIENT)
Dept: URBAN - METROPOLITAN AREA CLINIC 112 | Facility: CLINIC | Age: 58
Setting detail: OPHTHALMOLOGY
End: 2023-04-10
Payer: COMMERCIAL

## 2023-04-10 DIAGNOSIS — Z96.1: ICD-10-CM

## 2023-04-10 DIAGNOSIS — H25.11: ICD-10-CM

## 2023-04-10 PROCEDURE — 92136 OPHTHALMIC BIOMETRY: CPT | Performed by: REGISTERED NURSE

## 2023-04-10 PROCEDURE — 99024 POSTOP FOLLOW-UP VISIT: CPT | Performed by: REGISTERED NURSE

## 2023-04-10 ASSESSMENT — SPHEQUIV_DERIVED
OS_SPHEQUIV: 0.875
OS_SPHEQUIV: 0
OD_SPHEQUIV: 0.75
OS_SPHEQUIV: -3.625
OD_SPHEQUIV: 1.625
OS_SPHEQUIV: 1.625
OD_SPHEQUIV: -3.625
OD_SPHEQUIV: 1.25
OS_SPHEQUIV: 0.25

## 2023-04-10 ASSESSMENT — REFRACTION_MANIFEST
OD_SPHERE: -3.50
OD_VA1: 20/20
OD_SPHERE: +1.00
OU_VA: 20/20
OD_AXIS: 130
OS_SPHERE: -3.50
OS_CYLINDER: -0.25
OS_SPHERE: +1.00
OD_VA1: 20/20
OS_CYLINDER: SPH
OS_SPHERE: +1.25
OD_VA1: 20/20
OS_SPHERE: +1.75
OD_AXIS: 110
OD_CYLINDER: -0.50
OD_AXIS: 110
OD_CYLINDER: -0.50
OS_SPHERE: +0.50
OS_AXIS: 090
OS_CYLINDER: -0.25
OD_VA1: 20/20
OD_SPHERE: +1.25
OS_CYLINDER: -0.50
OS_VA1: 20/20
OS_AXIS: 080
OD_SPHERE: +1.50
OS_VA2: 20/20
OD_CYLINDER: SPH
OS_AXIS: 080
OS_AXIS: 070
OD_VA2: 20/20
OD_CYLINDER: -0.25
OS_VA1: 20/25
OS_VA1: 20/20
OS_VA1: 20/20
OS_CYLINDER: -0.25
OS_VA1: 20/20-1

## 2023-04-10 ASSESSMENT — CORNEAL EDEMA - MICROCYSTIC EPITHELIAL EDEMA (MCE): OS_MCE: 1+

## 2023-04-10 ASSESSMENT — REFRACTION_AUTOREFRACTION
OS_AXIS: 068
OD_SPHERE: +2.00
OD_AXIS: 109
OD_CYLINDER: -0.75
OS_SPHERE: +0.50
OS_CYLINDER: -1.00

## 2023-04-10 ASSESSMENT — AXIALLENGTH_DERIVED
OS_AL: 24.7417
OS_AL: 24.8493
OS_AL: 24.1662
OD_AL: 24.579
OS_AL: 26.5216
OD_AL: 24.2145
OS_AL: 24.4767
OD_AL: 24.3694
OD_AL: 26.5798

## 2023-04-10 ASSESSMENT — REFRACTION_CURRENTRX
OD_CYLINDER: -0.25
OS_OVR_VA: 20/
OS_CYLINDER: -0.25
OS_AXIS: 083
OS_SPHERE: -3.50
OD_SPHERE: -3.75
OD_OVR_VA: 20/
OD_AXIS: 129

## 2023-04-10 ASSESSMENT — TEAR BREAK UP TIME (TBUT)
OS_TBUT: 2+
OD_TBUT: 2+

## 2023-04-10 ASSESSMENT — KERATOMETRY
OD_K1POWER_DIOPTERS: 40.00
OS_K2POWER_DIOPTERS: 40.50
OD_K2POWER_DIOPTERS: 40.25
OD_AXISANGLE_DEGREES: 028
OS_AXISANGLE_DEGREES: 147
OS_K1POWER_DIOPTERS: 40.00

## 2023-04-10 ASSESSMENT — LID EXAM ASSESSMENTS
OD_BLEPHARITIS: 1+ 2+
OS_BLEPHARITIS: 1+ 2+

## 2023-04-10 ASSESSMENT — VISUAL ACUITY
OD_BCVA: 20/30
OS_BCVA: 20/70+1

## 2023-04-10 ASSESSMENT — CONFRONTATIONAL VISUAL FIELD TEST (CVF)
OS_FINDINGS: FULL
OD_FINDINGS: FULL

## 2023-04-10 ASSESSMENT — TONOMETRY: OD_IOP_MMHG: 14

## 2023-04-11 ENCOUNTER — ASC (OUTPATIENT)
Dept: URBAN - METROPOLITAN AREA SURGERY 8 | Facility: SURGERY | Age: 58
Setting detail: OPHTHALMOLOGY
End: 2023-04-11
Payer: COMMERCIAL

## 2023-04-11 DIAGNOSIS — H52.211: ICD-10-CM

## 2023-04-11 DIAGNOSIS — H25.11: ICD-10-CM

## 2023-04-11 PROCEDURE — V2788P PANOPTIX: Performed by: OPHTHALMOLOGY

## 2023-04-11 PROCEDURE — 66984 XCAPSL CTRC RMVL W/O ECP: CPT | Performed by: OPHTHALMOLOGY

## 2023-04-11 PROCEDURE — FEMTO FEMTOSECOND LASER: Performed by: OPHTHALMOLOGY

## 2023-04-12 ENCOUNTER — RX ONLY (RX ONLY)
Age: 58
End: 2023-04-12

## 2023-04-12 ENCOUNTER — OFFICE (OUTPATIENT)
Dept: URBAN - METROPOLITAN AREA CLINIC 94 | Facility: CLINIC | Age: 58
Setting detail: OPHTHALMOLOGY
End: 2023-04-12
Payer: COMMERCIAL

## 2023-04-12 DIAGNOSIS — Z96.1: ICD-10-CM

## 2023-04-12 PROCEDURE — 99024 POSTOP FOLLOW-UP VISIT: CPT | Performed by: PHYSICIAN ASSISTANT

## 2023-04-12 ASSESSMENT — REFRACTION_MANIFEST
OD_SPHERE: -3.50
OD_SPHERE: +1.25
OS_VA1: 20/20-1
OD_SPHERE: +1.50
OD_CYLINDER: -0.50
OS_CYLINDER: SPH
OS_CYLINDER: -0.25
OS_CYLINDER: -0.50
OS_VA2: 20/20
OS_SPHERE: +1.25
OS_SPHERE: +0.50
OS_AXIS: 070
OS_CYLINDER: -0.25
OU_VA: 20/20
OS_VA1: 20/20
OD_VA1: 20/20
OS_VA1: 20/25
OS_VA1: 20/20
OS_SPHERE: -3.50
OD_AXIS: 110
OD_VA1: 20/20
OS_SPHERE: +1.00
OS_CYLINDER: -0.25
OS_VA1: 20/20
OS_SPHERE: +1.75
OD_CYLINDER: -0.25
OD_SPHERE: +1.00
OS_AXIS: 080
OD_CYLINDER: SPH
OS_AXIS: 080
OD_AXIS: 130
OD_VA2: 20/20
OS_AXIS: 090
OD_AXIS: 110
OD_VA1: 20/20
OD_VA1: 20/20
OD_CYLINDER: -0.50

## 2023-04-12 ASSESSMENT — REFRACTION_CURRENTRX
OS_SPHERE: -3.50
OS_AXIS: 083
OS_CYLINDER: -0.25
OD_OVR_VA: 20/
OS_OVR_VA: 20/
OD_CYLINDER: -0.25
OD_AXIS: 129
OD_SPHERE: -3.75

## 2023-04-12 ASSESSMENT — KERATOMETRY
OD_K1POWER_DIOPTERS: 39.75
OD_AXISANGLE_DEGREES: 090
OS_AXISANGLE_DEGREES: 164
OS_K1POWER_DIOPTERS: 40.25
OS_K2POWER_DIOPTERS: 40.50
OD_K2POWER_DIOPTERS: 39.75

## 2023-04-12 ASSESSMENT — REFRACTION_AUTOREFRACTION
OS_AXIS: 074
OS_CYLINDER: -0.75
OD_AXIS: 124
OD_SPHERE: +0.25
OS_SPHERE: +0.25
OD_CYLINDER: -0.50

## 2023-04-12 ASSESSMENT — SPHEQUIV_DERIVED
OD_SPHEQUIV: 1.25
OS_SPHEQUIV: -0.125
OD_SPHEQUIV: -3.625
OS_SPHEQUIV: 1.625
OS_SPHEQUIV: 0.25
OS_SPHEQUIV: -3.625
OD_SPHEQUIV: 0
OD_SPHEQUIV: 0.75
OS_SPHEQUIV: 0.875

## 2023-04-12 ASSESSMENT — CONFRONTATIONAL VISUAL FIELD TEST (CVF)
OD_FINDINGS: FULL
OS_FINDINGS: FULL

## 2023-04-12 ASSESSMENT — AXIALLENGTH_DERIVED
OS_AL: 24.1181
OD_AL: 25.0547
OD_AL: 24.7294
OD_AL: 24.5172
OS_AL: 26.4637
OS_AL: 24.6913
OS_AL: 24.4274
OS_AL: 24.8524
OD_AL: 26.7557

## 2023-04-12 ASSESSMENT — CORNEAL EDEMA - MICROCYSTIC EPITHELIAL EDEMA (MCE): OS_MCE: 1+

## 2023-04-12 ASSESSMENT — VISUAL ACUITY
OS_BCVA: 20/30
OD_BCVA: 20/30

## 2023-04-12 ASSESSMENT — LID EXAM ASSESSMENTS
OD_BLEPHARITIS: 1+ 2+
OS_BLEPHARITIS: 1+ 2+

## 2023-04-12 ASSESSMENT — TEAR BREAK UP TIME (TBUT)
OS_TBUT: 2+
OD_TBUT: 2+

## 2023-04-14 ASSESSMENT — SPHEQUIV_DERIVED
OD_SPHEQUIV: -3.625
OD_SPHEQUIV: 1.375
OS_SPHEQUIV: 1.375
OD_SPHEQUIV: 1.25
OS_SPHEQUIV: 1.625
OS_SPHEQUIV: 1.375
OS_SPHEQUIV: 0.875
OD_SPHEQUIV: 1.375
OD_SPHEQUIV: 1.125
OS_SPHEQUIV: 0.875
OS_SPHEQUIV: 1.625
OS_SPHEQUIV: -3.625
OD_SPHEQUIV: 0.75
OS_SPHEQUIV: -3.625
OD_SPHEQUIV: 0.75
OD_SPHEQUIV: -3.625

## 2023-04-14 ASSESSMENT — REFRACTION_MANIFEST
OS_SPHERE: +1.50
OS_CYLINDER: -0.25
OD_CYLINDER: SPH
OS_VA1: 20/20
OS_VA1: 20/20
OS_VA1: 20/20-1
OD_AXIS: 130
OD_SPHERE: +1.50
OS_VA1: 20/20
OD_CYLINDER: SPH
OS_SPHERE: -3.50
OS_SPHERE: +1.25
OD_VA1: 20/20
OD_AXIS: 110
OS_AXIS: 080
OD_AXIS: 115
OU_VA: 20/20
OS_VA1: 20/20-1
OD_VA1: 20/20
OD_VA2: 20/20
OS_CYLINDER: SPH
OD_AXIS: 130
OS_CYLINDER: SPH
OS_AXIS: 090
OD_AXIS: 110
OS_SPHERE: +1.25
OS_SPHERE: -3.50
OD_SPHERE: +1.00
OS_AXIS: 090
OD_CYLINDER: -0.50
OS_AXIS: 080
OS_ADD: +1.75
OD_SPHERE: -3.50
OD_CYLINDER: -0.25
OD_VA2: 20/20
OD_SPHERE: +1.25
OS_CYLINDER: -0.25
OS_SPHERE: +1.00
OD_VA1: 20/20
OD_SPHERE: -3.50
OD_VA1: 20/20
OS_VA2: 20/20
OS_VA1: 20/20
OD_SPHERE: +1.50
OS_CYLINDER: -0.25
OD_VA1: 20/20
OD_SPHERE: +1.25
OS_VA2: 20/20
OD_CYLINDER: -0.50
OD_VA1: 20/20
OS_CYLINDER: -0.25
OD_VA1: 20/20
OS_CYLINDER: -0.25
OS_AXIS: 080
OD_VA1: 20/20
OD_CYLINDER: -0.25
OS_SPHERE: +1.00
OS_SPHERE: +1.75
OD_SPHERE: +1.00
OS_VA1: 20/20
OD_ADD: +1.75
OU_VA: 20/20
OD_CYLINDER: -0.50
OS_AXIS: 145
OS_CYLINDER: -0.25
OS_VA1: 20/20
OD_CYLINDER: -0.25
OD_AXIS: 110

## 2023-04-14 ASSESSMENT — KERATOMETRY
OS_K1POWER_DIOPTERS: 40.25
OD_K1POWER_DIOPTERS: 40.25
OS_K2POWER_DIOPTERS: 41.00
OS_K1POWER_DIOPTERS: 40.25
OD_AXISANGLE_DEGREES: 031
OD_AXISANGLE_DEGREES: 090
OD_K2POWER_DIOPTERS: 40.50
OD_K2POWER_DIOPTERS: 40.25
OS_AXISANGLE_DEGREES: 080
OD_K1POWER_DIOPTERS: 40.25
OS_K2POWER_DIOPTERS: 40.50
OS_AXISANGLE_DEGREES: 051

## 2023-04-14 ASSESSMENT — REFRACTION_AUTOREFRACTION
OD_SPHERE: +1.50
OS_CYLINDER: -0.25
OS_CYLINDER: -0.25
OD_AXIS: 111
OS_SPHERE: +1.50
OD_SPHERE: +1.50
OD_CYLINDER: -0.75
OD_AXIS: 116
OD_CYLINDER: -0.25
OS_AXIS: 078
OS_AXIS: 145
OS_SPHERE: +1.75

## 2023-04-14 ASSESSMENT — AXIALLENGTH_DERIVED
OD_AL: 26.4637
OS_AL: 24.3294
OS_AL: 24.1181
OS_AL: 24.1181
OS_AL: 24.4274
OD_AL: 24.4797
OD_AL: 24.2718
OS_AL: 24.1239
OD_AL: 24.5293
OS_AL: 26.3487
OD_AL: 24.3234
OS_AL: 26.4637
OS_AL: 24.1239
OD_AL: 26.5216
OD_AL: 24.2688
OD_AL: 24.2688

## 2023-04-14 ASSESSMENT — REFRACTION_CURRENTRX
OD_OVR_VA: 20/
OS_OVR_VA: 20/
OS_AXIS: 083
OS_SPHERE: -3.50
OS_OVR_VA: 20/
OD_CYLINDER: -0.25
OD_AXIS: 129
OS_SPHERE: -3.50
OS_AXIS: 083
OD_AXIS: 129
OS_CYLINDER: -0.25
OD_SPHERE: -3.75
OD_CYLINDER: -0.25
OS_CYLINDER: -0.25
OD_OVR_VA: 20/
OD_SPHERE: -3.75

## 2023-04-19 ENCOUNTER — OFFICE (OUTPATIENT)
Dept: URBAN - METROPOLITAN AREA CLINIC 94 | Facility: CLINIC | Age: 58
Setting detail: OPHTHALMOLOGY
End: 2023-04-19
Payer: COMMERCIAL

## 2023-04-19 ENCOUNTER — RX ONLY (RX ONLY)
Age: 58
End: 2023-04-19

## 2023-04-19 DIAGNOSIS — Z96.1: ICD-10-CM

## 2023-04-19 PROCEDURE — 99024 POSTOP FOLLOW-UP VISIT: CPT | Performed by: PHYSICIAN ASSISTANT

## 2023-04-19 ASSESSMENT — REFRACTION_CURRENTRX
OS_AXIS: 083
OS_SPHERE: -3.50
OD_OVR_VA: 20/
OD_CYLINDER: -0.25
OS_CYLINDER: -0.25
OD_SPHERE: -3.75
OD_AXIS: 129
OS_OVR_VA: 20/

## 2023-04-19 ASSESSMENT — REFRACTION_AUTOREFRACTION
OD_SPHERE: -0.25
OS_AXIS: 072
OD_CYLINDER: -0.75
OS_CYLINDER: -0.75
OD_AXIS: 099
OS_SPHERE: -0.25

## 2023-04-19 ASSESSMENT — REFRACTION_MANIFEST
OD_AXIS: 100
OD_AXIS: 130
OD_SPHERE: +1.00
OS_SPHERE: +0.50
OS_AXIS: 070
OD_SPHERE: -3.50
OS_SPHERE: -0.25
OS_CYLINDER: -0.25
OS_CYLINDER: -0.25
OS_VA1: 20/20
OD_VA1: 20/25
OS_SPHERE: -3.50
OD_VA1: 20/20
OU_VA: 20/20
OD_AXIS: 110
OD_CYLINDER: SPH
OD_CYLINDER: -0.50
OD_SPHERE: -0.25
OD_AXIS: 110
OD_VA1: 20/20
OD_CYLINDER: -0.50
OS_VA1: 20/20
OD_VA1: 20/20
OD_CYLINDER: -0.25
OS_AXIS: 080
OS_SPHERE: +1.75
OS_CYLINDER: -0.25
OS_VA1: 20/25
OS_CYLINDER: -0.75
OS_VA1: 20/25
OS_AXIS: 080
OS_CYLINDER: -0.50
OS_SPHERE: +1.00
OS_VA1: 20/20
OS_VA2: 20/20
OS_VA1: 20/20-1
OD_SPHERE: +1.25
OS_AXIS: 075
OD_VA2: 20/20
OD_VA1: 20/20
OD_CYLINDER: -0.50
OS_AXIS: 090
OD_SPHERE: +1.50
OS_CYLINDER: SPH
OS_SPHERE: +1.25

## 2023-04-19 ASSESSMENT — AXIALLENGTH_DERIVED
OD_AL: 24.9641
OS_AL: 25.0704
OS_AL: 26.4637
OS_AL: 24.6913
OD_AL: 24.4304
OD_AL: 24.2233
OD_AL: 25.0187
OD_AL: 26.4061
OS_AL: 24.1181
OS_AL: 24.4274
OS_AL: 25.0704

## 2023-04-19 ASSESSMENT — SPHEQUIV_DERIVED
OD_SPHEQUIV: -3.625
OD_SPHEQUIV: -0.625
OS_SPHEQUIV: 0.25
OS_SPHEQUIV: 1.625
OD_SPHEQUIV: -0.5
OS_SPHEQUIV: -0.625
OD_SPHEQUIV: 1.25
OS_SPHEQUIV: -3.625
OS_SPHEQUIV: 0.875
OD_SPHEQUIV: 0.75
OS_SPHEQUIV: -0.625

## 2023-04-19 ASSESSMENT — PUNCTA - ASSESSMENT
OD_PUNCTA: COL PLUG RLL
OS_PUNCTA: LLL

## 2023-04-19 ASSESSMENT — LID EXAM ASSESSMENTS
OD_BLEPHARITIS: 1+
OS_BLEPHARITIS: 1+

## 2023-04-19 ASSESSMENT — KERATOMETRY
OD_K1POWER_DIOPTERS: 40.25
OS_K2POWER_DIOPTERS: 40.50
OS_K1POWER_DIOPTERS: 40.25
OD_K2POWER_DIOPTERS: 40.75
OS_AXISANGLE_DEGREES: 156
OD_AXISANGLE_DEGREES: 013

## 2023-04-19 ASSESSMENT — TEAR BREAK UP TIME (TBUT)
OD_TBUT: 2+
OS_TBUT: 2+

## 2023-04-19 ASSESSMENT — CONFRONTATIONAL VISUAL FIELD TEST (CVF)
OS_FINDINGS: FULL
OD_FINDINGS: FULL

## 2023-04-19 ASSESSMENT — TONOMETRY
OD_IOP_MMHG: 18
OS_IOP_MMHG: 21

## 2023-04-19 ASSESSMENT — VISUAL ACUITY
OS_BCVA: 20/40
OD_BCVA: 20/40

## 2023-05-05 ENCOUNTER — OFFICE (OUTPATIENT)
Dept: URBAN - METROPOLITAN AREA CLINIC 94 | Facility: CLINIC | Age: 58
Setting detail: OPHTHALMOLOGY
End: 2023-05-05
Payer: COMMERCIAL

## 2023-05-05 DIAGNOSIS — H01.004: ICD-10-CM

## 2023-05-05 DIAGNOSIS — H35.373: ICD-10-CM

## 2023-05-05 DIAGNOSIS — H26.493: ICD-10-CM

## 2023-05-05 DIAGNOSIS — H01.001: ICD-10-CM

## 2023-05-05 DIAGNOSIS — H16.223: ICD-10-CM

## 2023-05-05 PROBLEM — Z96.1 PSEUDOPHAKIA ; BOTH EYES: Status: ACTIVE | Noted: 2023-04-07

## 2023-05-05 PROCEDURE — 99213 OFFICE O/P EST LOW 20 MIN: CPT | Performed by: OPHTHALMOLOGY

## 2023-05-05 ASSESSMENT — LID EXAM ASSESSMENTS
OS_BLEPHARITIS: 1+
OD_BLEPHARITIS: 1+

## 2023-05-05 ASSESSMENT — KERATOMETRY
OS_AXISANGLE_DEGREES: 158
OD_AXISANGLE_DEGREES: 090
OS_K2POWER_DIOPTERS: 40.50
OD_K2POWER_DIOPTERS: 40.50
OS_K1POWER_DIOPTERS: 40.25
OD_K1POWER_DIOPTERS: 40.50

## 2023-05-05 ASSESSMENT — CONFRONTATIONAL VISUAL FIELD TEST (CVF)
OD_FINDINGS: FULL
OS_FINDINGS: FULL

## 2023-05-05 ASSESSMENT — PUNCTA - ASSESSMENT
OS_PUNCTA: LLL
OD_PUNCTA: COL PLUG RLL

## 2023-05-05 ASSESSMENT — TEAR BREAK UP TIME (TBUT)
OS_TBUT: 2+
OD_TBUT: 2+

## 2023-05-05 ASSESSMENT — VISUAL ACUITY
OS_BCVA: 20/50-1
OD_BCVA: 20/25

## 2023-05-05 ASSESSMENT — TONOMETRY
OD_IOP_MMHG: 13
OS_IOP_MMHG: 18

## 2023-05-10 ENCOUNTER — ASC (OUTPATIENT)
Dept: URBAN - METROPOLITAN AREA SURGERY 8 | Facility: SURGERY | Age: 58
Setting detail: OPHTHALMOLOGY
End: 2023-05-10
Payer: COMMERCIAL

## 2023-05-10 DIAGNOSIS — H26.491: ICD-10-CM

## 2023-05-10 PROCEDURE — 66821 AFTER CATARACT LASER SURGERY: CPT | Performed by: OPHTHALMOLOGY

## 2023-05-10 ASSESSMENT — REFRACTION_MANIFEST
OS_AXIS: 080
OS_SPHERE: +1.25
OD_VA1: 20/20
OD_AXIS: 100
OS_SPHERE: +1.00
OS_CYLINDER: SPH
OS_VA1: 20/20-1
OS_VA1: 20/20
OS_SPHERE: -0.25
OS_VA1: 20/20
OD_VA1: 20/20
OD_VA2: 20/20
OS_CYLINDER: -0.25
OS_SPHERE: +1.75
OS_AXIS: 080
OS_CYLINDER: -0.25
OS_CYLINDER: -0.25
OD_SPHERE: +1.00
OD_CYLINDER: -0.50
OS_AXIS: 075
OD_SPHERE: +1.25
OS_CYLINDER: -0.75
OD_AXIS: 110
OS_VA1: 20/25
OD_SPHERE: -0.25
OD_CYLINDER: -0.50
OD_AXIS: 110
OD_SPHERE: +1.50
OD_AXIS: 130
OD_CYLINDER: -0.50
OD_CYLINDER: -0.25
OS_SPHERE: +0.50
OS_AXIS: 090
OS_VA1: 20/25
OS_VA2: 20/20
OU_VA: 20/20
OS_CYLINDER: -0.50
OS_SPHERE: -3.50
OD_VA1: 20/20
OS_AXIS: 070
OD_CYLINDER: SPH
OS_VA1: 20/20
OD_SPHERE: -3.50
OD_VA1: 20/25
OD_VA1: 20/20

## 2023-05-10 ASSESSMENT — REFRACTION_CURRENTRX
OS_AXIS: 083
OS_SPHERE: -3.50
OS_CYLINDER: -0.25
OD_CYLINDER: -0.25
OD_OVR_VA: 20/
OS_OVR_VA: 20/
OD_AXIS: 129
OD_SPHERE: -3.75

## 2023-05-10 ASSESSMENT — REFRACTION_AUTOREFRACTION
OD_CYLINDER: -0.75
OD_AXIS: 106
OS_AXIS: 081
OD_SPHERE: -0.50
OS_SPHERE: +0.25
OS_CYLINDER: -0.75

## 2023-05-10 ASSESSMENT — AXIALLENGTH_DERIVED
OS_AL: 26.4637
OS_AL: 25.0704
OD_AL: 24.4304
OS_AL: 24.8524
OD_AL: 24.9641
OS_AL: 24.6913
OD_AL: 24.2233
OD_AL: 26.4061
OS_AL: 24.4274
OD_AL: 25.1287
OS_AL: 24.1181

## 2023-05-10 ASSESSMENT — SPHEQUIV_DERIVED
OD_SPHEQUIV: 0.75
OD_SPHEQUIV: -3.625
OS_SPHEQUIV: 1.625
OS_SPHEQUIV: 0.875
OS_SPHEQUIV: -0.625
OS_SPHEQUIV: -0.125
OD_SPHEQUIV: -0.875
OD_SPHEQUIV: 1.25
OS_SPHEQUIV: -3.625
OS_SPHEQUIV: 0.25
OD_SPHEQUIV: -0.5

## 2023-05-10 ASSESSMENT — VISUAL ACUITY
OD_BCVA: 20/25
OS_BCVA: 20/40

## 2023-05-10 ASSESSMENT — KERATOMETRY
OD_K2POWER_DIOPTERS: 40.50
OD_AXISANGLE_DEGREES: 090
OS_K2POWER_DIOPTERS: 40.50
OS_K1POWER_DIOPTERS: 40.25
OD_K1POWER_DIOPTERS: 40.50
OS_AXISANGLE_DEGREES: 158

## 2023-05-10 ASSESSMENT — TONOMETRY: OS_IOP_MMHG: 13

## 2023-05-12 ENCOUNTER — ASC (OUTPATIENT)
Dept: URBAN - METROPOLITAN AREA SURGERY 8 | Facility: SURGERY | Age: 58
Setting detail: OPHTHALMOLOGY
End: 2023-05-12
Payer: COMMERCIAL

## 2023-05-12 DIAGNOSIS — H26.492: ICD-10-CM

## 2023-05-12 PROCEDURE — 66821 AFTER CATARACT LASER SURGERY: CPT | Performed by: OPHTHALMOLOGY

## 2023-05-12 ASSESSMENT — AXIALLENGTH_DERIVED
OD_AL: 25.1287
OS_AL: 24.4274
OS_AL: 26.4637
OD_AL: 24.9641
OD_AL: 24.2233
OD_AL: 24.4304
OS_AL: 25.0704
OS_AL: 24.6913
OS_AL: 24.1181
OD_AL: 26.4061
OS_AL: 24.8524

## 2023-05-12 ASSESSMENT — REFRACTION_MANIFEST
OS_SPHERE: -3.50
OS_VA2: 20/20
OD_VA1: 20/20
OD_SPHERE: +1.00
OD_VA1: 20/20
OS_CYLINDER: SPH
OS_CYLINDER: -0.25
OD_CYLINDER: SPH
OS_AXIS: 070
OD_CYLINDER: -0.50
OS_CYLINDER: -0.75
OS_AXIS: 075
OS_SPHERE: +0.50
OD_SPHERE: +1.25
OS_CYLINDER: -0.50
OD_SPHERE: -0.25
OS_SPHERE: -0.25
OD_CYLINDER: -0.50
OS_VA1: 20/25
OS_VA1: 20/25
OS_VA1: 20/20
OS_CYLINDER: -0.25
OD_AXIS: 110
OD_AXIS: 130
OD_CYLINDER: -0.50
OD_AXIS: 110
OD_VA1: 20/20
OD_VA2: 20/20
OD_VA1: 20/20
OD_VA1: 20/25
OD_CYLINDER: -0.25
OD_SPHERE: +1.50
OS_SPHERE: +1.75
OS_AXIS: 080
OU_VA: 20/20
OS_VA1: 20/20
OS_CYLINDER: -0.25
OS_VA1: 20/20-1
OS_AXIS: 090
OS_VA1: 20/20
OS_SPHERE: +1.25
OS_AXIS: 080
OD_AXIS: 100
OS_SPHERE: +1.00
OD_SPHERE: -3.50

## 2023-05-12 ASSESSMENT — SPHEQUIV_DERIVED
OS_SPHEQUIV: -3.625
OS_SPHEQUIV: -0.125
OD_SPHEQUIV: 1.25
OS_SPHEQUIV: 0.25
OD_SPHEQUIV: -0.5
OS_SPHEQUIV: -0.625
OD_SPHEQUIV: 0.75
OS_SPHEQUIV: 0.875
OS_SPHEQUIV: 1.625
OD_SPHEQUIV: -0.875
OD_SPHEQUIV: -3.625

## 2023-05-12 ASSESSMENT — REFRACTION_AUTOREFRACTION
OD_SPHERE: -0.50
OS_SPHERE: +0.25
OD_AXIS: 106
OS_CYLINDER: -0.75
OD_CYLINDER: -0.75
OS_AXIS: 081

## 2023-05-12 ASSESSMENT — REFRACTION_CURRENTRX
OD_CYLINDER: -0.25
OS_SPHERE: -3.50
OD_SPHERE: -3.75
OD_AXIS: 129
OS_CYLINDER: -0.25
OS_OVR_VA: 20/
OD_OVR_VA: 20/
OS_AXIS: 083

## 2023-05-12 ASSESSMENT — VISUAL ACUITY
OD_BCVA: 20/25
OS_BCVA: 20/40

## 2023-05-12 ASSESSMENT — KERATOMETRY
OS_K1POWER_DIOPTERS: 40.25
OD_K2POWER_DIOPTERS: 40.50
OD_K1POWER_DIOPTERS: 40.50
OS_AXISANGLE_DEGREES: 158
OS_K2POWER_DIOPTERS: 40.50
OD_AXISANGLE_DEGREES: 090

## 2023-06-02 ENCOUNTER — OFFICE (OUTPATIENT)
Dept: URBAN - METROPOLITAN AREA CLINIC 94 | Facility: CLINIC | Age: 58
Setting detail: OPHTHALMOLOGY
End: 2023-06-02
Payer: COMMERCIAL

## 2023-06-02 DIAGNOSIS — H26.492: ICD-10-CM

## 2023-06-02 DIAGNOSIS — H26.491: ICD-10-CM

## 2023-06-02 DIAGNOSIS — H26.493: ICD-10-CM

## 2023-06-02 PROCEDURE — 99024 POSTOP FOLLOW-UP VISIT: CPT | Performed by: PHYSICIAN ASSISTANT

## 2023-06-02 ASSESSMENT — SPHEQUIV_DERIVED
OD_SPHEQUIV: 0.75
OD_SPHEQUIV: 1.25
OD_SPHEQUIV: -3.625
OS_SPHEQUIV: -3.625
OS_SPHEQUIV: 0.875
OD_SPHEQUIV: -0.5
OD_SPHEQUIV: -0.5
OS_SPHEQUIV: 0.25
OS_SPHEQUIV: 1.625
OS_SPHEQUIV: -0.375
OS_SPHEQUIV: -0.625

## 2023-06-02 ASSESSMENT — VISUAL ACUITY
OS_BCVA: 20/20
OD_BCVA: 20/20-1

## 2023-06-02 ASSESSMENT — REFRACTION_AUTOREFRACTION
OD_AXIS: 109
OS_SPHERE: -0.25
OS_CYLINDER: -0.25
OD_SPHERE: -0.25
OD_CYLINDER: -0.50
OS_AXIS: 107

## 2023-06-02 ASSESSMENT — KERATOMETRY
METHOD_AUTO_MANUAL: AUTO
OS_K2POWER_DIOPTERS: 40.50
OD_K2POWER_DIOPTERS: 40.50
OD_AXISANGLE_DEGREES: 090
OS_AXISANGLE_DEGREES: 158
OD_K1POWER_DIOPTERS: 40.50
OS_K1POWER_DIOPTERS: 40.25

## 2023-06-02 ASSESSMENT — REFRACTION_MANIFEST
OS_AXIS: 080
OD_CYLINDER: -0.50
OS_SPHERE: +1.75
OD_SPHERE: +1.25
OD_VA1: 20/20
OS_CYLINDER: -0.25
OD_VA1: 20/25
OD_AXIS: 110
OU_VA: 20/20
OS_VA1: 20/25
OD_VA1: 20/20
OS_SPHERE: -0.25
OD_CYLINDER: -0.25
OS_VA1: 20/20
OS_VA1: 20/20-1
OS_AXIS: 080
OS_CYLINDER: -0.75
OD_VA1: 20/20
OD_CYLINDER: -0.50
OS_SPHERE: +0.50
OS_AXIS: 090
OD_SPHERE: +1.00
OD_VA1: 20/20
OD_SPHERE: +1.50
OS_SPHERE: -3.50
OS_CYLINDER: -0.25
OS_SPHERE: +1.25
OS_VA1: 20/20
OS_CYLINDER: -0.50
OD_CYLINDER: SPH
OD_SPHERE: -3.50
OS_VA2: 20/20
OS_CYLINDER: SPH
OD_AXIS: 110
OD_AXIS: 130
OS_AXIS: 070
OS_VA1: 20/25
OS_AXIS: 075
OS_SPHERE: +1.00
OD_VA2: 20/20
OS_VA1: 20/20
OS_CYLINDER: -0.25
OD_SPHERE: -0.25
OD_AXIS: 100
OD_CYLINDER: -0.50

## 2023-06-02 ASSESSMENT — LID EXAM ASSESSMENTS
OD_BLEPHARITIS: 1+
OS_BLEPHARITIS: 1+

## 2023-06-02 ASSESSMENT — CONFRONTATIONAL VISUAL FIELD TEST (CVF)
OD_FINDINGS: FULL
OS_FINDINGS: FULL

## 2023-06-02 ASSESSMENT — REFRACTION_CURRENTRX
OS_CYLINDER: -0.25
OD_OVR_VA: 20/
OS_OVR_VA: 20/
OS_AXIS: 083
OD_SPHERE: -3.75
OD_CYLINDER: -0.25
OD_AXIS: 129
OS_SPHERE: -3.50

## 2023-06-02 ASSESSMENT — TONOMETRY
OS_IOP_MMHG: 11
OD_IOP_MMHG: 11

## 2023-06-02 ASSESSMENT — TEAR BREAK UP TIME (TBUT)
OD_TBUT: 2+
OS_TBUT: 2+

## 2023-06-02 ASSESSMENT — AXIALLENGTH_DERIVED
OD_AL: 24.2233
OS_AL: 25.0704
OD_AL: 24.9641
OD_AL: 26.4061
OD_AL: 24.9641
OS_AL: 24.9609
OS_AL: 24.4274
OS_AL: 26.4637
OS_AL: 24.6913
OS_AL: 24.1181
OD_AL: 24.4304

## 2023-06-02 ASSESSMENT — PUNCTA - ASSESSMENT
OS_PUNCTA: LLL
OD_PUNCTA: COL PLUG RLL

## 2023-07-07 ENCOUNTER — RX ONLY (RX ONLY)
Age: 58
End: 2023-07-07

## 2023-07-07 ENCOUNTER — OFFICE (OUTPATIENT)
Dept: URBAN - METROPOLITAN AREA CLINIC 94 | Facility: CLINIC | Age: 58
Setting detail: OPHTHALMOLOGY
End: 2023-07-07
Payer: COMMERCIAL

## 2023-07-07 DIAGNOSIS — H35.033: ICD-10-CM

## 2023-07-07 DIAGNOSIS — H35.373: ICD-10-CM

## 2023-07-07 DIAGNOSIS — H16.223: ICD-10-CM

## 2023-07-07 DIAGNOSIS — H43.391: ICD-10-CM

## 2023-07-07 PROBLEM — H10.45 ALLERGIC CONJUNCTIVITIS: Status: RESOLVED | Noted: 2023-06-02 | Resolved: 2023-07-07

## 2023-07-07 PROCEDURE — 99024 POSTOP FOLLOW-UP VISIT: CPT | Performed by: OPHTHALMOLOGY

## 2023-07-07 ASSESSMENT — REFRACTION_MANIFEST
OS_VA2: 20/20
OS_SPHERE: -3.50
OS_SPHERE: +1.00
OS_AXIS: 080
OD_AXIS: 110
OS_AXIS: 070
OD_CYLINDER: -0.25
OD_CYLINDER: -0.50
OS_AXIS: 090
OS_AXIS: 070
OS_VA1: 20/25
OD_CYLINDER: SPH
OD_SPHERE: +1.50
OD_SPHERE: +1.50
OD_SPHERE: +1.25
OS_SPHERE: +1.25
OD_SPHERE: +1.25
OS_CYLINDER: -0.25
OD_VA1: 20/20
OS_AXIS: 080
OS_VA1: 20/25
OD_VA1: 20/20
OS_CYLINDER: -0.50
OS_CYLINDER: -0.25
OD_VA2: 20/20
OD_CYLINDER: -0.50
OD_VA1: 20/25
OS_AXIS: 080
OD_CYLINDER: -0.50
OS_SPHERE: -3.50
OD_VA1: 20/20
OS_SPHERE: +1.25
OS_SPHERE: +0.50
OS_VA1: 20/20
OD_SPHERE: +1.50
OD_SPHERE: +1.00
OS_VA1: 20/20
OS_CYLINDER: -0.50
OD_VA1: 20/20
OU_VA: 20/20
OS_CYLINDER: -0.25
OS_AXIS: 090
OD_CYLINDER: -0.50
OS_AXIS: 090
OD_AXIS: 110
OS_SPHERE: -3.50
OD_SPHERE: -0.25
OS_CYLINDER: SPH
OD_AXIS: 130
OS_VA1: 20/25
OD_VA1: 20/20
OD_AXIS: 100
OS_SPHERE: +0.50
OS_SPHERE: +1.25
OD_CYLINDER: -0.50
OS_SPHERE: +1.75
OD_VA1: 20/20
OS_AXIS: 080
OD_VA2: 20/20
OS_SPHERE: +1.75
OS_VA1: 20/20
OS_CYLINDER: -0.25
OS_CYLINDER: -0.25
OS_AXIS: 080
OD_VA1: 20/25
OD_AXIS: 110
OS_VA1: 20/20-1
OD_CYLINDER: SPH
OD_SPHERE: +1.00
OD_AXIS: 110
OS_CYLINDER: -0.75
OD_AXIS: 130
OU_VA: 20/20
OD_CYLINDER: -0.25
OD_AXIS: 110
OS_CYLINDER: -0.25
OD_CYLINDER: SPH
OD_SPHERE: -0.25
OD_VA1: 20/20
OD_VA1: 20/20
OD_AXIS: 130
OD_AXIS: 110
OD_VA1: 20/20
OD_VA1: 20/20
OS_CYLINDER: -0.25
OS_SPHERE: +1.00
OS_VA1: 20/20
OS_VA2: 20/20
OD_CYLINDER: -0.50
OS_VA1: 20/20
OS_VA1: 20/20
OS_CYLINDER: -0.75
OS_SPHERE: +1.00
OU_VA: 20/20
OD_CYLINDER: -0.50
OS_AXIS: 075
OS_CYLINDER: -0.25
OD_SPHERE: +1.25
OS_VA2: 20/20
OD_AXIS: 100
OS_CYLINDER: SPH
OS_VA1: 20/20
OS_AXIS: 080
OS_CYLINDER: -0.25
OS_VA1: 20/20
OS_VA1: 20/20
OS_CYLINDER: SPH
OD_VA1: 20/20
OS_SPHERE: -0.25
OD_VA2: 20/20
OD_VA1: 20/20
OD_SPHERE: -3.50
OD_CYLINDER: -0.50
OD_SPHERE: -3.50
OS_SPHERE: +1.75
OD_SPHERE: +1.00
OS_VA1: 20/20-1
OS_AXIS: 075
OS_VA1: 20/20-1
OS_SPHERE: -0.25
OD_CYLINDER: -0.25
OS_VA1: 20/25
OD_SPHERE: -3.50

## 2023-07-07 ASSESSMENT — SPHEQUIV_DERIVED
OD_SPHEQUIV: 1.25
OS_SPHEQUIV: 1.625
OD_SPHEQUIV: -0.375
OS_SPHEQUIV: -0.625
OS_SPHEQUIV: 1.625
OS_SPHEQUIV: 1.625
OD_SPHEQUIV: 1.25
OD_SPHEQUIV: 1.25
OD_SPHEQUIV: -3.625
OD_SPHEQUIV: -3.625
OS_SPHEQUIV: 0.25
OS_SPHEQUIV: -0.375
OS_SPHEQUIV: 0.875
OS_SPHEQUIV: -0.625
OD_SPHEQUIV: 1.125
OS_SPHEQUIV: -3.625
OD_SPHEQUIV: -0.5
OS_SPHEQUIV: -0.125
OD_SPHEQUIV: 0.75
OS_SPHEQUIV: 1.5
OS_SPHEQUIV: 0.25
OD_SPHEQUIV: -0.5
OS_SPHEQUIV: -3.625
OD_SPHEQUIV: -0.875
OS_SPHEQUIV: 0.875
OS_SPHEQUIV: 0.875
OD_SPHEQUIV: 0.75
OD_SPHEQUIV: 0.75
OD_SPHEQUIV: -3.625
OS_SPHEQUIV: -3.625

## 2023-07-07 ASSESSMENT — LID EXAM ASSESSMENTS
OD_BLEPHARITIS: 1+
OS_BLEPHARITIS: 1+

## 2023-07-07 ASSESSMENT — TONOMETRY
OS_IOP_MMHG: 17
OD_IOP_MMHG: 16

## 2023-07-07 ASSESSMENT — AXIALLENGTH_DERIVED
OS_AL: 24.8524
OS_AL: 25.0704
OD_AL: 24.4304
OD_AL: 25.0673
OS_AL: 24.7417
OS_AL: 26.5216
OS_AL: 24.1181
OS_AL: 25.0124
OS_AL: 24.6913
OD_AL: 26.2915
OD_AL: 26.5216
OD_AL: 24.3323
OS_AL: 24.4274
OS_AL: 24.4767
OS_AL: 26.4637
OD_AL: 25.0124
OS_AL: 26.5798
OS_AL: 24.1662
OD_AL: 24.9641
OS_AL: 24.5263
OS_AL: 24.2659
OD_AL: 24.1269
OD_AL: 24.5293
OD_AL: 25.1287
OS_AL: 25.1224
OD_AL: 24.3205
OS_AL: 24.2145
OD_AL: 26.4061
OD_AL: 24.1779
OD_AL: 24.2233

## 2023-07-07 ASSESSMENT — KERATOMETRY
OS_K2POWER_DIOPTERS: 40.25
OS_K2POWER_DIOPTERS: 40.50
OD_K2POWER_DIOPTERS: 40.25
OD_K2POWER_DIOPTERS: 41.50
OD_K1POWER_DIOPTERS: 40.50
OS_K1POWER_DIOPTERS: 40.25
OD_AXISANGLE_DEGREES: 173
OS_K1POWER_DIOPTERS: 40.25
OS_AXISANGLE_DEGREES: 097
OD_AXISANGLE_DEGREES: 090
OD_K2POWER_DIOPTERS: 40.50
OS_AXISANGLE_DEGREES: 158
OS_AXISANGLE_DEGREES: 010
METHOD_AUTO_MANUAL: AUTO
OD_K1POWER_DIOPTERS: 40.25
OD_AXISANGLE_DEGREES: 090
OS_K1POWER_DIOPTERS: 40.00
OD_K1POWER_DIOPTERS: 40.00
OS_K2POWER_DIOPTERS: 40.25

## 2023-07-07 ASSESSMENT — REFRACTION_CURRENTRX
OD_OVR_VA: 20/
OS_SPHERE: -3.50
OD_AXIS: 129
OS_OVR_VA: 20/
OS_CYLINDER: -0.25
OD_SPHERE: -3.75
OD_CYLINDER: -0.25
OS_AXIS: 083
OD_AXIS: 129
OS_CYLINDER: -0.25
OD_CYLINDER: -0.25
OD_SPHERE: -3.75
OD_OVR_VA: 20/
OS_SPHERE: -3.50
OS_AXIS: 083
OS_OVR_VA: 20/
OD_SPHERE: -3.75
OS_OVR_VA: 20/
OS_CYLINDER: -0.25
OD_CYLINDER: -0.25
OD_OVR_VA: 20/
OS_AXIS: 083
OD_AXIS: 129
OS_SPHERE: -3.50

## 2023-07-07 ASSESSMENT — TEAR BREAK UP TIME (TBUT)
OD_TBUT: 2+
OS_TBUT: 2+

## 2023-07-07 ASSESSMENT — REFRACTION_AUTOREFRACTION
OD_AXIS: 108
OS_CYLINDER: -0.25
OS_AXIS: 095
OD_SPHERE: -0.50
OS_AXIS: 097
OS_CYLINDER: -0.50
OD_AXIS: 106
OS_SPHERE: +0.25
OD_CYLINDER: -0.75
OD_CYLINDER: -0.75
OD_SPHERE: 0.00
OD_SPHERE: +1.50
OD_CYLINDER: -0.75
OS_CYLINDER: -0.75
OD_AXIS: 092
OS_SPHERE: -0.25
OS_SPHERE: +1.75
OS_AXIS: 081

## 2023-07-07 ASSESSMENT — CONFRONTATIONAL VISUAL FIELD TEST (CVF)
OD_FINDINGS: FULL
OS_FINDINGS: FULL

## 2023-07-07 ASSESSMENT — PUNCTA - ASSESSMENT
OS_PUNCTA: LLL
OD_PUNCTA: COL PLUG RLL

## 2023-07-07 ASSESSMENT — VISUAL ACUITY
OS_BCVA: 20/25
OD_BCVA: 20/20

## 2023-07-15 ENCOUNTER — OFFICE (OUTPATIENT)
Dept: URBAN - METROPOLITAN AREA CLINIC 94 | Facility: CLINIC | Age: 58
Setting detail: OPHTHALMOLOGY
End: 2023-07-15
Payer: COMMERCIAL

## 2023-07-15 DIAGNOSIS — H43.391: ICD-10-CM

## 2023-07-15 DIAGNOSIS — Z96.1: ICD-10-CM

## 2023-07-15 DIAGNOSIS — H16.223: ICD-10-CM

## 2023-07-15 DIAGNOSIS — H35.033: ICD-10-CM

## 2023-07-15 DIAGNOSIS — H35.373: ICD-10-CM

## 2023-07-15 PROCEDURE — 92134 CPTRZ OPH DX IMG PST SGM RTA: CPT | Performed by: OPHTHALMOLOGY

## 2023-07-15 PROCEDURE — 99024 POSTOP FOLLOW-UP VISIT: CPT | Performed by: OPHTHALMOLOGY

## 2023-07-15 ASSESSMENT — AXIALLENGTH_DERIVED
OD_AL: 25.0124
OS_AL: 25.0124

## 2023-07-15 ASSESSMENT — KERATOMETRY
OD_AXISANGLE_DEGREES: 090
OS_AXISANGLE_DEGREES: 097
OD_K2POWER_DIOPTERS: 40.25
METHOD_AUTO_MANUAL: AUTO
OS_K2POWER_DIOPTERS: 40.25
OD_K1POWER_DIOPTERS: 40.25
OS_K1POWER_DIOPTERS: 40.25

## 2023-07-15 ASSESSMENT — TONOMETRY: OS_IOP_MMHG: 10

## 2023-07-15 ASSESSMENT — CONFRONTATIONAL VISUAL FIELD TEST (CVF)
OS_FINDINGS: FULL
OD_FINDINGS: FULL

## 2023-07-15 ASSESSMENT — REFRACTION_AUTOREFRACTION
OS_AXIS: 097
OD_CYLINDER: -0.75
OS_CYLINDER: -0.25
OS_SPHERE: -0.25
OD_AXIS: 108
OD_SPHERE: 0.00

## 2023-07-15 ASSESSMENT — TEAR BREAK UP TIME (TBUT)
OS_TBUT: 2+
OD_TBUT: 2+

## 2023-07-15 ASSESSMENT — LID EXAM ASSESSMENTS
OD_BLEPHARITIS: 1+
OS_BLEPHARITIS: 1+

## 2023-07-15 ASSESSMENT — SPHEQUIV_DERIVED
OS_SPHEQUIV: -0.375
OD_SPHEQUIV: -0.375

## 2023-07-15 ASSESSMENT — VISUAL ACUITY
OD_BCVA: 20/20
OS_BCVA: 20/25

## 2023-07-15 ASSESSMENT — PUNCTA - ASSESSMENT
OS_PUNCTA: LLL
OD_PUNCTA: COL PLUG RLL

## 2023-07-26 ENCOUNTER — OFFICE (OUTPATIENT)
Dept: URBAN - METROPOLITAN AREA CLINIC 112 | Facility: CLINIC | Age: 58
Setting detail: OPHTHALMOLOGY
End: 2023-07-26
Payer: COMMERCIAL

## 2023-07-26 DIAGNOSIS — H10.012: ICD-10-CM

## 2023-07-26 DIAGNOSIS — H01.004: ICD-10-CM

## 2023-07-26 DIAGNOSIS — H26.493: ICD-10-CM

## 2023-07-26 DIAGNOSIS — H04.123: ICD-10-CM

## 2023-07-26 DIAGNOSIS — H01.001: ICD-10-CM

## 2023-07-26 PROBLEM — H43.391 VITREOUS FLOATERS; RIGHT EYE: Status: ACTIVE | Noted: 2023-07-07

## 2023-07-26 PROCEDURE — 99213 OFFICE O/P EST LOW 20 MIN: CPT | Performed by: OPHTHALMOLOGY

## 2023-07-26 ASSESSMENT — LID EXAM ASSESSMENTS
OS_BLEPHARITIS: 1+
OD_BLEPHARITIS: 1+

## 2023-07-26 ASSESSMENT — REFRACTION_AUTOREFRACTION
OD_AXIS: 113
OD_CYLINDER: -0.25
OD_SPHERE: -0.50
OS_CYLINDER: 0.00
OS_AXIS: 000
OS_SPHERE: -0.50

## 2023-07-26 ASSESSMENT — TONOMETRY
OS_IOP_MMHG: 13
OD_IOP_MMHG: 11

## 2023-07-26 ASSESSMENT — KERATOMETRY
METHOD_AUTO_MANUAL: AUTO
OS_AXISANGLE_DEGREES: 089
OS_K2POWER_DIOPTERS: 40.75
OD_K2POWER_DIOPTERS: 40.50
OD_AXISANGLE_DEGREES: 090
OS_K1POWER_DIOPTERS: 40.50
OD_K1POWER_DIOPTERS: 40.50

## 2023-07-26 ASSESSMENT — TEAR BREAK UP TIME (TBUT)
OS_TBUT: 2+
OD_TBUT: 2+

## 2023-07-26 ASSESSMENT — VISUAL ACUITY
OS_BCVA: 20/20-1
OD_BCVA: 20/20-1

## 2023-07-26 ASSESSMENT — PUNCTA - ASSESSMENT
OS_PUNCTA: LLL
OD_PUNCTA: COL PLUG RLL

## 2023-07-26 ASSESSMENT — CONFRONTATIONAL VISUAL FIELD TEST (CVF)
OD_FINDINGS: FULL
OS_FINDINGS: FULL

## 2023-07-26 ASSESSMENT — AXIALLENGTH_DERIVED
OD_AL: 25.0187
OS_AL: 24.9127

## 2023-07-26 ASSESSMENT — SPHEQUIV_DERIVED
OS_SPHEQUIV: -0.5
OD_SPHEQUIV: -0.625

## 2023-08-18 ENCOUNTER — ASC (OUTPATIENT)
Dept: URBAN - METROPOLITAN AREA SURGERY 8 | Facility: SURGERY | Age: 58
Setting detail: OPHTHALMOLOGY
End: 2023-08-18
Payer: COMMERCIAL

## 2023-08-18 DIAGNOSIS — H43.391: ICD-10-CM

## 2023-08-18 PROCEDURE — 67036 REMOVAL OF INNER EYE FLUID: CPT | Performed by: OPHTHALMOLOGY

## 2023-08-19 ENCOUNTER — OFFICE (OUTPATIENT)
Dept: URBAN - METROPOLITAN AREA CLINIC 94 | Facility: CLINIC | Age: 58
Setting detail: OPHTHALMOLOGY
End: 2023-08-19
Payer: COMMERCIAL

## 2023-08-19 DIAGNOSIS — H43.391: ICD-10-CM

## 2023-08-19 PROCEDURE — 99024 POSTOP FOLLOW-UP VISIT: CPT | Performed by: OPHTHALMOLOGY

## 2023-08-19 ASSESSMENT — PUNCTA - ASSESSMENT
OD_PUNCTA: COL PLUG RLL
OS_PUNCTA: LLL

## 2023-08-19 ASSESSMENT — AXIALLENGTH_DERIVED
OD_AL: 25.0187
OS_AL: 24.9127

## 2023-08-19 ASSESSMENT — REFRACTION_AUTOREFRACTION
OS_CYLINDER: 0.00
OD_AXIS: 113
OS_AXIS: 000
OD_CYLINDER: -0.25
OD_SPHERE: -0.50
OS_SPHERE: -0.50

## 2023-08-19 ASSESSMENT — VISUAL ACUITY
OS_BCVA: 20/40
OD_BCVA: 20/20-1

## 2023-08-19 ASSESSMENT — KERATOMETRY
OD_K1POWER_DIOPTERS: 40.50
OS_K1POWER_DIOPTERS: 40.50
OS_K2POWER_DIOPTERS: 40.75
METHOD_AUTO_MANUAL: AUTO
OD_K2POWER_DIOPTERS: 40.50
OD_AXISANGLE_DEGREES: 090
OS_AXISANGLE_DEGREES: 089

## 2023-08-19 ASSESSMENT — SPHEQUIV_DERIVED
OS_SPHEQUIV: -0.5
OD_SPHEQUIV: -0.625

## 2023-08-19 ASSESSMENT — TEAR BREAK UP TIME (TBUT)
OS_TBUT: 2+
OD_TBUT: 2+

## 2023-08-19 ASSESSMENT — LID EXAM ASSESSMENTS
OD_BLEPHARITIS: 1+
OS_BLEPHARITIS: 1+

## 2023-08-19 ASSESSMENT — CONFRONTATIONAL VISUAL FIELD TEST (CVF)
OD_FINDINGS: FULL
OS_FINDINGS: FULL

## 2023-08-19 ASSESSMENT — TONOMETRY: OS_IOP_MMHG: 10

## 2023-09-06 ENCOUNTER — RX ONLY (RX ONLY)
Age: 58
End: 2023-09-06

## 2023-09-06 ENCOUNTER — OFFICE (OUTPATIENT)
Dept: URBAN - METROPOLITAN AREA CLINIC 94 | Facility: CLINIC | Age: 58
Setting detail: OPHTHALMOLOGY
End: 2023-09-06
Payer: COMMERCIAL

## 2023-09-06 DIAGNOSIS — H43.391: ICD-10-CM

## 2023-09-06 PROCEDURE — 99024 POSTOP FOLLOW-UP VISIT: CPT | Performed by: OPHTHALMOLOGY

## 2023-09-06 ASSESSMENT — VISUAL ACUITY
OS_BCVA: 20/50-
OD_BCVA: 20/20

## 2023-09-06 ASSESSMENT — KERATOMETRY
OS_K1POWER_DIOPTERS: 40.50
OD_K2POWER_DIOPTERS: 40.50
OD_AXISANGLE_DEGREES: 090
METHOD_AUTO_MANUAL: AUTO
OS_K2POWER_DIOPTERS: 40.75
OD_K1POWER_DIOPTERS: 40.50
OS_AXISANGLE_DEGREES: 089

## 2023-09-06 ASSESSMENT — PUNCTA - ASSESSMENT
OS_PUNCTA: LLL
OD_PUNCTA: COL PLUG RLL

## 2023-09-06 ASSESSMENT — REFRACTION_AUTOREFRACTION
OS_CYLINDER: 0.00
OD_CYLINDER: -0.25
OS_SPHERE: -0.50
OD_AXIS: 113
OS_AXIS: 000
OD_SPHERE: -0.50

## 2023-09-06 ASSESSMENT — LID EXAM ASSESSMENTS
OS_BLEPHARITIS: 1+
OD_BLEPHARITIS: 1+

## 2023-09-06 ASSESSMENT — TEAR BREAK UP TIME (TBUT)
OS_TBUT: 2+
OD_TBUT: 2+

## 2023-09-06 ASSESSMENT — SPHEQUIV_DERIVED
OS_SPHEQUIV: -0.5
OD_SPHEQUIV: -0.625

## 2023-09-06 ASSESSMENT — CONFRONTATIONAL VISUAL FIELD TEST (CVF)
OS_FINDINGS: FULL
OD_FINDINGS: FULL

## 2023-09-06 ASSESSMENT — TONOMETRY
OS_IOP_MMHG: 11
OD_IOP_MMHG: 14

## 2023-09-06 ASSESSMENT — AXIALLENGTH_DERIVED
OS_AL: 24.9127
OD_AL: 25.0187

## 2023-10-17 ENCOUNTER — OFFICE (OUTPATIENT)
Dept: URBAN - METROPOLITAN AREA CLINIC 94 | Facility: CLINIC | Age: 58
Setting detail: OPHTHALMOLOGY
End: 2023-10-17
Payer: COMMERCIAL

## 2023-10-17 DIAGNOSIS — H43.391: ICD-10-CM

## 2023-10-17 PROCEDURE — 99024 POSTOP FOLLOW-UP VISIT: CPT | Performed by: OPHTHALMOLOGY

## 2023-10-17 ASSESSMENT — LID EXAM ASSESSMENTS
OS_BLEPHARITIS: 1+
OD_BLEPHARITIS: 1+

## 2023-10-17 ASSESSMENT — TONOMETRY
OD_IOP_MMHG: 12
OS_IOP_MMHG: 10

## 2023-10-17 ASSESSMENT — SPHEQUIV_DERIVED
OS_SPHEQUIV: -0.5
OD_SPHEQUIV: -0.625

## 2023-10-17 ASSESSMENT — REFRACTION_AUTOREFRACTION
OD_CYLINDER: -0.25
OS_SPHERE: -0.50
OD_SPHERE: -0.50
OS_CYLINDER: 0.00
OD_AXIS: 113
OS_AXIS: 000

## 2023-10-17 ASSESSMENT — KERATOMETRY
OS_K2POWER_DIOPTERS: 40.75
OD_K2POWER_DIOPTERS: 40.50
OS_K1POWER_DIOPTERS: 40.50
OS_AXISANGLE_DEGREES: 089
METHOD_AUTO_MANUAL: AUTO
OD_K1POWER_DIOPTERS: 40.50
OD_AXISANGLE_DEGREES: 090

## 2023-10-17 ASSESSMENT — VISUAL ACUITY
OS_BCVA: 20/30+1
OD_BCVA: 20/20

## 2023-10-17 ASSESSMENT — AXIALLENGTH_DERIVED
OS_AL: 24.9127
OD_AL: 25.0187

## 2023-10-17 ASSESSMENT — TEAR BREAK UP TIME (TBUT)
OS_TBUT: 2+
OD_TBUT: 2+

## 2023-10-17 ASSESSMENT — CONFRONTATIONAL VISUAL FIELD TEST (CVF)
OS_FINDINGS: FULL
OD_FINDINGS: FULL

## 2023-10-17 ASSESSMENT — PUNCTA - ASSESSMENT
OD_PUNCTA: COL PLUG RLL
OS_PUNCTA: LLL

## 2023-11-28 ENCOUNTER — OFFICE (OUTPATIENT)
Dept: URBAN - METROPOLITAN AREA CLINIC 114 | Facility: CLINIC | Age: 58
Setting detail: OPHTHALMOLOGY
End: 2023-11-28
Payer: COMMERCIAL

## 2023-11-28 DIAGNOSIS — H35.373: ICD-10-CM

## 2023-11-28 DIAGNOSIS — H16.221: ICD-10-CM

## 2023-11-28 DIAGNOSIS — H16.223: ICD-10-CM

## 2023-11-28 DIAGNOSIS — H16.222: ICD-10-CM

## 2023-11-28 DIAGNOSIS — H43.391: ICD-10-CM

## 2023-11-28 PROCEDURE — 83861 MICROFLUID ANALY TEARS: CPT | Mod: LT | Performed by: OPHTHALMOLOGY

## 2023-11-28 PROCEDURE — 92012 INTRM OPH EXAM EST PATIENT: CPT | Performed by: OPHTHALMOLOGY

## 2023-11-28 PROCEDURE — 83861 MICROFLUID ANALY TEARS: CPT | Mod: RT | Performed by: OPHTHALMOLOGY

## 2023-11-28 ASSESSMENT — REFRACTION_AUTOREFRACTION
OD_CYLINDER: -0.75
OS_CYLINDER: 0.00
OS_SPHERE: -0.25
OD_SPHERE: 0.00
OD_AXIS: 090
OS_AXIS: 000

## 2023-11-28 ASSESSMENT — REFRACTION_MANIFEST
OS_CYLINDER: SPH
OS_SPHERE: -0.25
OD_AXIS: 090
OD_VA1: 20/20
OS_VA1: 20/20
OD_SPHERE: PLANO
OD_CYLINDER: -0.50

## 2023-11-28 ASSESSMENT — CONFRONTATIONAL VISUAL FIELD TEST (CVF)
OS_FINDINGS: FULL
OD_FINDINGS: FULL

## 2023-11-28 ASSESSMENT — TEAR BREAK UP TIME (TBUT)
OD_TBUT: 2+
OS_TBUT: 2+

## 2023-11-28 ASSESSMENT — PUNCTA - ASSESSMENT
OS_PUNCTA: LLL
OD_PUNCTA: COL PLUG RLL

## 2023-11-28 ASSESSMENT — LID EXAM ASSESSMENTS
OS_BLEPHARITIS: 1+
OD_BLEPHARITIS: 1+

## 2023-11-28 ASSESSMENT — SUPERFICIAL PUNCTATE KERATITIS (SPK)
OD_SPK: T 1+
OS_SPK: T 1+

## 2023-11-28 ASSESSMENT — SPHEQUIV_DERIVED
OD_SPHEQUIV: -0.375
OS_SPHEQUIV: -0.25

## 2024-04-07 PROBLEM — C43.72 MELANOMA OF LOWER LEG, LEFT: Status: ACTIVE | Noted: 2024-04-07

## 2024-04-08 ENCOUNTER — APPOINTMENT (OUTPATIENT)
Dept: SURGICAL ONCOLOGY | Facility: CLINIC | Age: 59
End: 2024-04-08
Payer: COMMERCIAL

## 2024-04-08 VITALS
BODY MASS INDEX: 22.9 KG/M2 | RESPIRATION RATE: 17 BRPM | SYSTOLIC BLOOD PRESSURE: 164 MMHG | OXYGEN SATURATION: 98 % | HEIGHT: 70 IN | DIASTOLIC BLOOD PRESSURE: 95 MMHG | WEIGHT: 160 LBS | HEART RATE: 63 BPM

## 2024-04-08 DIAGNOSIS — C43.72 MALIGNANT MELANOMA OF LEFT LOWER LIMB, INCLUDING HIP: ICD-10-CM

## 2024-04-08 PROCEDURE — 99205 OFFICE O/P NEW HI 60 MIN: CPT

## 2024-04-09 ENCOUNTER — NON-APPOINTMENT (OUTPATIENT)
Age: 59
End: 2024-04-09

## 2024-04-10 ENCOUNTER — APPOINTMENT (OUTPATIENT)
Dept: ULTRASOUND IMAGING | Facility: CLINIC | Age: 59
End: 2024-04-10
Payer: COMMERCIAL

## 2024-04-10 ENCOUNTER — APPOINTMENT (OUTPATIENT)
Dept: RADIOLOGY | Facility: CLINIC | Age: 59
End: 2024-04-10
Payer: COMMERCIAL

## 2024-04-10 PROCEDURE — 71046 X-RAY EXAM CHEST 2 VIEWS: CPT

## 2024-04-10 PROCEDURE — 76882 US LMTD JT/FCL EVL NVASC XTR: CPT | Mod: LT

## 2024-04-18 ENCOUNTER — OUTPATIENT (OUTPATIENT)
Dept: OUTPATIENT SERVICES | Facility: HOSPITAL | Age: 59
LOS: 1 days | End: 2024-04-18
Payer: COMMERCIAL

## 2024-04-18 ENCOUNTER — RESULT REVIEW (OUTPATIENT)
Age: 59
End: 2024-04-18

## 2024-04-18 DIAGNOSIS — C80.1 MALIGNANT (PRIMARY) NEOPLASM, UNSPECIFIED: ICD-10-CM

## 2024-04-18 PROCEDURE — 88323 CONSLTJ&REPRT MATRL PREP SLD: CPT

## 2024-04-18 PROCEDURE — 88341 IMHCHEM/IMCYTCHM EA ADD ANTB: CPT

## 2024-04-18 PROCEDURE — 88342 IMHCHEM/IMCYTCHM 1ST ANTB: CPT

## 2024-04-18 PROCEDURE — 88323 CONSLTJ&REPRT MATRL PREP SLD: CPT | Mod: 26

## 2024-04-18 PROCEDURE — 88271 CYTOGENETICS DNA PROBE: CPT

## 2024-04-18 PROCEDURE — 88342 IMHCHEM/IMCYTCHM 1ST ANTB: CPT | Mod: 26

## 2024-04-18 PROCEDURE — 88341 IMHCHEM/IMCYTCHM EA ADD ANTB: CPT | Mod: 26

## 2024-04-30 ENCOUNTER — APPOINTMENT (OUTPATIENT)
Dept: SURGICAL ONCOLOGY | Facility: HOSPITAL | Age: 59
End: 2024-04-30

## 2024-05-08 ENCOUNTER — APPOINTMENT (OUTPATIENT)
Dept: UROGYNECOLOGY | Facility: CLINIC | Age: 59
End: 2024-05-08
Payer: COMMERCIAL

## 2024-05-08 VITALS
HEIGHT: 70 IN | SYSTOLIC BLOOD PRESSURE: 138 MMHG | TEMPERATURE: 98.7 F | DIASTOLIC BLOOD PRESSURE: 85 MMHG | BODY MASS INDEX: 22.9 KG/M2 | WEIGHT: 160 LBS

## 2024-05-08 PROCEDURE — 51798 US URINE CAPACITY MEASURE: CPT

## 2024-05-08 PROCEDURE — 99204 OFFICE O/P NEW MOD 45 MIN: CPT

## 2024-05-08 RX ORDER — ESTRADIOL 0.1 MG/G
0.1 CREAM VAGINAL
Qty: 1 | Refills: 1 | Status: ACTIVE | COMMUNITY
Start: 2024-05-08 | End: 1900-01-01

## 2024-05-08 RX ORDER — TELMISARTAN 40 MG/1
40 TABLET ORAL
Refills: 0 | Status: ACTIVE | COMMUNITY
Start: 2024-05-08

## 2024-05-08 NOTE — DISCUSSION/SUMMARY
[FreeTextEntry1] : I had a long discussion with Corinne re: LEXIE ROJO, GSLUCIUS. I reviewed the pathologies, possible etiologies, diagnosis, symptoms and treatment options available.   Estradiol cream ftp amt to introitus qhs x 1 month then decrease to 3x/week. Amount and location of cream application was demonstrated to patient today in office via mirror demonstration. I explained that the cream does not work overnight and she needs to continue it for a minimum of 6-8 weeks before we re-evaluate efficacy.  Gemtesa 75mg po qd with dinner. AE including but not limited to UTI, urinary retention, h/a rev'd. I explained that it may take up to 4 weeks to determine full efficacy.   UA and C&S sent today PVR = 0ml  I gave Corinne rx's for UA and C&S. She was instructed that if she has UTI symptoms to drop off a urine with rx at any lab. She should call the office 3-4d afterwards for results. In the interim she should increase water intake and may take AZO OTC 1-2 tabs up to TID as needed. Take with food. Increase water intake.  Needs to f/u with urology for renal calculi and renal cysts.   RTO 1 month

## 2024-05-08 NOTE — HISTORY OF PRESENT ILLNESS
[FreeTextEntry1] : No dysuria, no hematuria, endorses pressure L side. No bladder pain. no hesitancy, complete bladder emptying.   Last few times culture negative but was still treated with abx. UTI symptoms occur post-coitally.  Took AZO OTC this past weekend which helped her symptoms. Frequency q 1-2 hours, nocturia x 2.  Non-smoker.   Sexually active 1 male partner. No entry or deep dyspareunia.  Hysterectomy (partial) 2014 - fibroid; has ovaries Paps wnl mammograms wnl Was just treated for VVC, no RVVC, RBV  Hx kidney stones - ureteroscopy March 2023.   Recently has been having UUI x 6 weeks.  Wears pad, during the day x 2-3, not saturated.   Melanoma L leg  Lymph node biopsy

## 2024-05-08 NOTE — PHYSICAL EXAM
[No Acute Distress] : in no acute distress [Well developed] : well developed [Well Nourished] : ~L well nourished [Good Hygeine] : demonstrates good hygeine [Oriented x3] : oriented to person, place, and time [Normal Memory] : ~T memory was ~L unimpaired [Normal Mood/Affect] : mood and affect are normal [Respirations regular] : ~T respiratory rate was regular [No Edema] : ~T edema was not present [Warm and Dry] : was warm and dry to touch [Normal Gait] : gait was normal [No Lesions] : no lesions were seen on the external genitalia [Vulvar Atrophy] : vulvar atrophy [Labia Majora] : were normal [Labia Minora] : were normal [Normal Appearance] : general appearance was normal [Atrophy] : atrophy [No Bleeding] : there was no active vaginal bleeding [Normal] : no abnormalities [Post Void Residual ____ml] : post void residual was [unfilled] ml [Exam Deferred] : was deferred [Tenderness] : ~T no ~M abdominal tenderness observed [Distended] : not distended [de-identified] : Q-tip touch test negative. No erythema, no erosions, no ulcerations, no fissures. [FreeTextEntry4] : PFMs wnl, supple, non-tender to palpation

## 2024-05-09 LAB
APPEARANCE: CLEAR
BACTERIA: NEGATIVE /HPF
BILIRUBIN URINE: NEGATIVE
BLOOD URINE: NEGATIVE
CALCIUM OXALATE CRYSTALS: PRESENT
CAST: 0 /LPF
COLOR: YELLOW
EPITHELIAL CELLS: 1 /HPF
GLUCOSE QUALITATIVE U: NEGATIVE MG/DL
KETONES URINE: NEGATIVE MG/DL
LEUKOCYTE ESTERASE URINE: NEGATIVE
MICROSCOPIC-UA: NORMAL
NITRITE URINE: NEGATIVE
PH URINE: 6.5
PROTEIN URINE: NEGATIVE MG/DL
RED BLOOD CELLS URINE: 2 /HPF
REVIEW: NORMAL
SPECIFIC GRAVITY URINE: 1.02
UROBILINOGEN URINE: 0.2 MG/DL
WHITE BLOOD CELLS URINE: 1 /HPF

## 2024-05-14 LAB — BACTERIA UR CULT: ABNORMAL

## 2024-05-14 RX ORDER — CIPROFLOXACIN HYDROCHLORIDE 250 MG/1
250 TABLET, FILM COATED ORAL
Qty: 10 | Refills: 0 | Status: ACTIVE | COMMUNITY
Start: 2024-05-14 | End: 1900-01-01

## 2024-05-31 ENCOUNTER — NON-APPOINTMENT (OUTPATIENT)
Age: 59
End: 2024-05-31

## 2024-05-31 DIAGNOSIS — R39.9 UNSPECIFIED SYMPTOMS AND SIGNS INVOLVING THE GENITOURINARY SYSTEM: ICD-10-CM

## 2024-06-03 ENCOUNTER — APPOINTMENT (OUTPATIENT)
Dept: UROGYNECOLOGY | Facility: CLINIC | Age: 59
End: 2024-06-03

## 2024-06-04 NOTE — REVIEW OF SYSTEMS
[Negative] : Heme/Lymph [FreeTextEntry5] : Hypertension [FreeTextEntry7] : NKDA [FreeTextEntry8] : History of cholecystectomy [FreeTextEntry9] : Stress incontinence [de-identified] : Melanoma

## 2024-06-04 NOTE — PHYSICAL EXAM
[Normal] : supple, no neck mass and thyroid not enlarged [Normal Neck Lymph Nodes] : normal neck lymph nodes  [Normal Supraclavicular Lymph Nodes] : normal supraclavicular lymph nodes [Normal Groin Lymph Nodes] : normal groin lymph nodes [Normal Axillary Lymph Nodes] : normal axillary lymph nodes [Normal] : full range of motion and no deformities appreciated [de-identified] : See diagram

## 2024-06-04 NOTE — HISTORY OF PRESENT ILLNESS
[de-identified] : 58-year-old lady.  Referred by dermatology: Dr. Jose David LOPEZ.  CC: 1.5 mm, nodular (T2a) melanoma of her LEFT LEG (distal third, medial aspect).  This is a cutaneous lesion that she has been aware of since ~2023. She nicked it while shaving her legs, and never properly healed. Persistence led to dermatology evaluation, biopsy, and the above diagnosis.   No previous personal history of skin cancer.  No prior personal history of malignancy.   No relatives with skin cancer.  + Family history of malignancy: Father: Lymphoma.   Dermatology: Dr. Jose David LOPEZ.   PMD: ADRIENNE Salmon. [facility] of [facility]  + ALLERGIC: Latex.  NKDA.  No pacemaker or defibrillator. No anticoagulants.  + Hypertension. Treated with telmisartan.  She does not see a cardiologist.  + Stress incontinence. Urogynecology: Dr. Maryanne WARE.  + History of nephrolithiasis. Last episode was 2023 and required hospitalization at [facility]. She has not been able to find a urologist, so I contacted our navigators to arrange for a consultation.  + History of cholecystectomy. [facility], .   [facility].   2014: Robotic abdominal hysterectomy for asymptomatic uterine fibroid. She still has both tubes and ovaries.  Her gynecologist is Dr. Misa ROMAN. Spring 2023 visit was unremarkable.   3, para 3. Surgical menopause  (partial hysterectomy at [facility]).  She has mammography at [facility]. 2023 evaluations were normal.   Last colonoscopy was . She is aware to schedule a follow-up.

## 2024-06-04 NOTE — ASSESSMENT
[FreeTextEntry1] : 58-year-old lady.  Recently diagnosed 1.5 mm melanoma of the left leg.  Diagnosis explained.  I have suggested for her preoperative evaluation that she have: Chest x-ray. Left groin ultrasound. Both prescriptions entered. I have asked her to call me a week after the imaging to discuss the results.  If there are no imaging abnormalities, for surgical management I recommended: Wide excision, with sentinel node biopsy, as an outpatient surgical procedure, coordinated with plastics.  I presented the oncologic concerns, the operative approach, the risk, benefits, alternatives, possible surgical outcomes.  Reviewed in detail, all questions answered.  She understands and like to proceed with the operation as described. Paperwork for surgical scheduling submitted  Note dictated.   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 Special studies pending....   5/8/2024: I called her to review the above possible change in diagnosis. I had to leave a voicemail.   06/04/2024. While the patient never returned my call after the above voicemail, she did call our surgical scheduling department to notify us that she will be pursuing the remainder of her care elsewhere.

## 2024-06-04 NOTE — REASON FOR VISIT
[Initial Consultation] : an initial consultation for [Other: _____] : [unfilled] [FreeTextEntry2] : 1.5 mm melanoma of the left leg

## 2024-06-05 ENCOUNTER — APPOINTMENT (OUTPATIENT)
Dept: UROGYNECOLOGY | Facility: CLINIC | Age: 59
End: 2024-06-05
Payer: COMMERCIAL

## 2024-06-05 VITALS — SYSTOLIC BLOOD PRESSURE: 149 MMHG | DIASTOLIC BLOOD PRESSURE: 88 MMHG

## 2024-06-05 DIAGNOSIS — Z87.442 PERSONAL HISTORY OF URINARY CALCULI: ICD-10-CM

## 2024-06-05 DIAGNOSIS — N39.0 URINARY TRACT INFECTION, SITE NOT SPECIFIED: ICD-10-CM

## 2024-06-05 DIAGNOSIS — N95.8 OTHER SPECIFIED MENOPAUSAL AND PERIMENOPAUSAL DISORDERS: ICD-10-CM

## 2024-06-05 DIAGNOSIS — B37.31 ACUTE CANDIDIASIS OF VULVA AND VAGINA: ICD-10-CM

## 2024-06-05 DIAGNOSIS — N39.41 URGE INCONTINENCE: ICD-10-CM

## 2024-06-05 DIAGNOSIS — N89.8 OTHER SPECIFIED NONINFLAMMATORY DISORDERS OF VAGINA: ICD-10-CM

## 2024-06-05 PROCEDURE — 87210 SMEAR WET MOUNT SALINE/INK: CPT | Mod: QW

## 2024-06-05 PROCEDURE — 99214 OFFICE O/P EST MOD 30 MIN: CPT

## 2024-06-05 PROCEDURE — 83986 ASSAY PH BODY FLUID NOS: CPT | Mod: QW

## 2024-06-05 PROCEDURE — 51798 US URINE CAPACITY MEASURE: CPT

## 2024-06-05 RX ORDER — CIPROFLOXACIN HYDROCHLORIDE 250 MG/1
250 TABLET, FILM COATED ORAL
Qty: 10 | Refills: 0 | Status: ACTIVE | COMMUNITY
Start: 2024-06-05 | End: 1900-01-01

## 2024-06-05 RX ORDER — FLUCONAZOLE 200 MG/1
200 TABLET ORAL
Qty: 3 | Refills: 0 | Status: ACTIVE | COMMUNITY
Start: 2024-06-05 | End: 1900-01-01

## 2024-06-05 RX ORDER — VIBEGRON 75 MG/1
75 TABLET, FILM COATED ORAL
Qty: 30 | Refills: 11 | Status: ACTIVE | COMMUNITY
Start: 2024-06-05 | End: 1900-01-01

## 2024-06-05 NOTE — HISTORY OF PRESENT ILLNESS
[FreeTextEntry1] : Corinne is here for a f/u visit.  She has been taking Gemtesa 75mg QD with great results. She endorses little to no UUI. She wishes to continue. She has +C&S 5/9/24: 10-49K enterococcus faecalis and was treated with cipro 250mg po BID x 5d. Today she feels like she has another UTI. +bladder pressure, frequency, urgency.  Denies UUI, hematuria, fever, chills, n/v.  She took AZO OTC over the weekend with +relief. She is using topical estradiol PV qhs.  She did not yet f/u with urology for kidney stones and renal cyst.  She thinks she has VVC. She has +vaginal discharge. No odor, no itching.

## 2024-06-05 NOTE — DISCUSSION/SUMMARY
[FreeTextEntry1] : PVR = 0ml UA and C&S sent Cipro 250mg po BID x 5d. Will call if neg culture for her to d/c abx. If +C&S, t/c methenamine I once again gave Corinne rxs for UA, C&S. She was instructed that at the first s/s of go to the lab or office with the rx for UA and C&S to drop off urine specimen. She understands that she needs to call the office 3-4d after she drops off her urine for the results. In the interim, she may take AZO OTC BID-TID prn with food and should increase water intake.  Wet prep done in office pH=5.0, no lacto, rare epithelial cells, few hyphae, no clue cells, no whiff, no trich, WBC <10/HPF, ++ parabasal cells KOH prep ++hyphae Fluconazole 200mg 1 po q3d x 3 doses  Continue Gemtesa 75mg po QD Continue estradiol ftp to introitus 3x/week.  RTO 2 months

## 2024-06-05 NOTE — PHYSICAL EXAM
[No Acute Distress] : in no acute distress [Well developed] : well developed [Well Nourished] : ~L well nourished [Good Hygeine] : demonstrates good hygeine [Oriented x3] : oriented to person, place, and time [Normal Memory] : ~T memory was ~L unimpaired [Normal Mood/Affect] : mood and affect are normal [Respirations regular] : ~T respiratory rate was regular [No Edema] : ~T edema was not present [None] : no CVA tenderness [Warm and Dry] : was warm and dry to touch [Normal Gait] : gait was normal [No Lesions] : no lesions were seen on the external genitalia [Vulvar Atrophy] : vulvar atrophy [Labia Majora] : were normal [Labia Minora] : were normal [Normal] : was normal [Normal Appearance] : general appearance was normal [Atrophy] : atrophy [No Bleeding] : there was no active vaginal bleeding [Post Void Residual ____ml] : post void residual was [unfilled] ml [Tenderness] : tenderness [Exam Deferred] : was deferred [Distended] : not distended [de-identified] : Q-tip touch test negative. No erythema, no erosions, no ulcerations, no fissures. [FreeTextEntry4] : PFMs wnl, supple, non-tender to palpation

## 2024-06-06 ENCOUNTER — NON-APPOINTMENT (OUTPATIENT)
Age: 59
End: 2024-06-06

## 2024-06-06 LAB
APPEARANCE: ABNORMAL
BACTERIA: ABNORMAL /HPF
BILIRUBIN URINE: NEGATIVE
BLOOD URINE: ABNORMAL
CALCIUM OXALATE CRYSTALS: PRESENT
CANDIDA VAG CYTO: DETECTED
CAST: 2 /LPF
COLOR: YELLOW
EPITHELIAL CELLS: 34 /HPF
G VAGINALIS+PREV SP MTYP VAG QL MICRO: DETECTED
GLUCOSE QUALITATIVE U: NEGATIVE MG/DL
KETONES URINE: ABNORMAL MG/DL
LEUKOCYTE ESTERASE URINE: ABNORMAL
MICROSCOPIC-UA: NORMAL
NITRITE URINE: NEGATIVE
PH URINE: 6
PROTEIN URINE: NORMAL MG/DL
RED BLOOD CELLS URINE: 3 /HPF
REVIEW: NORMAL
SPECIFIC GRAVITY URINE: 1.02
T VAGINALIS VAG QL WET PREP: NOT DETECTED
UROBILINOGEN URINE: 0.2 MG/DL
WHITE BLOOD CELLS URINE: 49 /HPF

## 2024-06-07 LAB
BACTERIA UR CULT: NORMAL
SURGICAL PATHOLOGY STUDY: SIGNIFICANT CHANGE UP

## 2024-06-10 DIAGNOSIS — N76.0 ACUTE VAGINITIS: ICD-10-CM

## 2024-06-10 DIAGNOSIS — B96.89 ACUTE VAGINITIS: ICD-10-CM

## 2024-06-10 RX ORDER — METRONIDAZOLE 7.5 MG/G
0.75 GEL VAGINAL
Qty: 1 | Refills: 0 | Status: ACTIVE | COMMUNITY
Start: 2024-06-10

## 2024-09-30 ENCOUNTER — NON-APPOINTMENT (OUTPATIENT)
Age: 59
End: 2024-09-30

## 2024-10-01 LAB
APPEARANCE: CLEAR
BACTERIA: NEGATIVE /HPF
BILIRUBIN URINE: NEGATIVE
BLOOD URINE: NEGATIVE
CAST: 0 /LPF
COLOR: YELLOW
EPITHELIAL CELLS: 0 /HPF
GLUCOSE QUALITATIVE U: NEGATIVE MG/DL
KETONES URINE: NEGATIVE MG/DL
LEUKOCYTE ESTERASE URINE: NEGATIVE
MICROSCOPIC-UA: NORMAL
NITRITE URINE: NEGATIVE
PH URINE: 7
PROTEIN URINE: NEGATIVE MG/DL
RED BLOOD CELLS URINE: 0 /HPF
SPECIFIC GRAVITY URINE: 1.01
UROBILINOGEN URINE: 0.2 MG/DL
WHITE BLOOD CELLS URINE: 0 /HPF

## 2024-10-04 ENCOUNTER — NON-APPOINTMENT (OUTPATIENT)
Age: 59
End: 2024-10-04

## 2024-10-04 LAB
A VAGINAE DNA VAG QL NAA+PROBE: NORMAL
BACTERIA UR CULT: ABNORMAL
BVAB2 DNA VAG QL NAA+PROBE: NORMAL
C KRUSEI DNA VAG QL NAA+PROBE: NEGATIVE
C TRACH RRNA SPEC QL NAA+PROBE: NEGATIVE
CANDIDA DNA VAG QL NAA+PROBE: NEGATIVE
MEGA1 DNA VAG QL NAA+PROBE: NORMAL
N GONORRHOEA RRNA SPEC QL NAA+PROBE: NEGATIVE
T VAGINALIS RRNA SPEC QL NAA+PROBE: NEGATIVE

## 2024-10-07 NOTE — ED CDU PROVIDER DISPOSITION NOTE - NSFOLLOWUPINSTRUCTIONS_ED_ALL_ED_FT
Occupational Therapy    Visit Type: treatment    Relevant History/Co-morbidities: 10/3/24 - s/p pericardial window   10/5/24 - IR CT placement for R pleural effusion  10/7/24 - CT removed by IR    SUBJECTIVE  Patient agreed to participate in therapy this date.  RN in agreement to work with patient for therapy session.  Patient pleasant and joking throughout session.    Denies any pain nor shortness of breath with all activity.   Patient / Family Goal: return home and return to previous functional status    Pain   Patient denies pain.    At onset of session (out of 10): 0    OBJECTIVE     Cognitive Status   Level of Consciousness   - alert  Arousal Alertness   - appropriate responses to stimuli  Affect/Behavior    - cooperative and pleasant  Orientation    - Oriented to: person, place, time and situation  Functional Communication   - Overall Communication Status: within functional limits   - Forms of Communication: verbal  Attention Span    - Attention: appears intact  Following Direction   - follows all commands and directions consistently  Transition Between Tasks   - transitions without difficulty  Memory   - - decreased short term memory  Awareness of Deficits   - assistance required to compensate for deficits  Verbal Expression   - intact    Vitals:  2L O2  SpO2:  resting 95%, with activity 90 - 92% with steady recovery once seated     Patient Activity Tolerance: 2 to 1 activity to rest (limited by drop in BP, however pt not symptomatic; supine /79, sitting 103/67, standing 101/56 and upon return to supine pt returned to 147/69; 3L of O2)    Hand Dominance: right-handed      Range of Motion (ROM)   (degrees unless noted; active unless noted; norms in ( ); negative=lacking to 0, positive=beyond 0)  WFL: LUE, RUE    Strength  (out of 5 unless noted, standard test position unless noted)   WFL: LUE, RUE      Sitting Balance  (KENTRELL = base of support)  Static      - Trial 1 details: with back unsupported and  modified independent  Dynamic      - Trial 1 details: with back unsupported and supervision    Standing Balance  (KENTRELL = base of support)  Firm Surface: Double Leg      - Static, Eyes Open       - Trial 1 details: with double UE support and stand by assist     - Dynamic, Eyes Open       - Trial 1 details: reaches with one hand, stand by assist and with verbal cues      Coordination  Gross Motor:  LUE: grossly intact and no apparent deficits  RUE: grossly intact and no apparent deficits  Fine Motor:  LUE: grossly intact and no apparent deficits RUE: grossly intact and no apparent deficits      Transfers  Assistive devices: gait belt, 2-wheeled walker  - Sit to stand: stand by assist, with verbal cues  - Stand to sit: stand by assist, with verbal cues  Repeated cues for safe hand placement.  Patient maintains hands positioned on walker with transfers, lending to wheels of walker to tip during transfers.  Following multiple cues, pt demonstrates carryover on technique. Will continue to reinforce.     Transfers completed x4 during OT session.         Functional Ambulation  - Assistance: stand by assist and with verbal cues (cues for PLB and pacing self.  Pt SpO2 drops to 90% with out of room mobility (pt denies dyspnea).)  - Assistive device: gait belt and 2-wheeled walker  - Distance (ft):25; 125  Activities of Daily Living (ADLs)  Grooming/Oral Hygiene:   - Grooming assist: supervision  - Position: standing at sink  - Assist needed for: supervision/safety  Lower Body Dressing:   - Footwear:       - Assistance: supervision (for safety due to drop in SpO2 with activity. (pt asymtomatic))       - Position: chair       - Type: socks  - Assist needed for: supervision/safety  Toileting:   - Assist: supervision (safety 2/2 lines, impaired respiratory status)  - Position: standing  - Assist needed for: supervision/safety  - Equipment: urinal  Bathing:   Bathing assist: not formally completed however writer educated pt on  benefits of utilizing a shower chair for bathing upon d/c 2/2 impaired respiratory and activity tolerance. Pt agreeable.  Bathing equipment: recommended use of   a shower chair upon d/c.  Instrumental Activities of Daily Living:  - Assistive device: gait belt and 2-wheeled walker  Open/Close closet and cabinet Doors: assistance level: Stand By Assistance (SBA)  verbal cues required for walker positioning / safety, body alignment  initial demonstration provided  assistive device used: gait belt, 2 wheeled walker  Item Retrieval: assistance level: Stand By Assistance (SBA), from lower and higher surfaces  initial demonstration provided  Reason for assistance: requires increased time to complete, safety due to impaired respiratory status with drop in SpO2 (90%) - asymptomatic, impaired walker safety with diminished carryover with technique following initial demonstration requiring repeated cues.       Interventions      Additional exercise details: Patient completed incentive spirometry x750 - 1000 ml.  (Degrades in performance).  Pt reports completing IS x2 daily with writer educating pt on benefits for recovery and encouraging increased performance.  Pt agreeable.       Treatment provided: activity tolerance, ADL training, body mechanics, breathing/relaxation, compensatory techniques, safety training, transfer training, functional ambulation, energy conservation, IADL training, positioning and use of adaptive equipment  Skilled input: verbal instruction/cues, as detailed above and posture correction  Verbal Consent: Writer verbally educated and received verbal consent for hand placement, positioning of patient, and techniques to be performed today from patient for clothing adjustments for techniques as described above and how they are pertinent to the patient's plan of care.         Education:   - Present and ready to learn: patient  Education provided during session:  - Results of above outlined education:  please take medication as prescribed and follow instruction   please discontinue the pyridium and make sure finish the antibiotic   call and follow up with  urology as given   call and follow up with primary care within 1-2 days   Urinary Tract Infection    A urinary tract infection (UTI) is an infection of any part of the urinary tract, which includes the kidneys, ureters, bladder, and urethra. Risk factors include ignoring your need to urinate, wiping back to front if female, being an uncircumcised male, and having diabetes or a weak immune system. Symptoms include frequent urination, pain or burning with urination, foul smelling urine, cloudy urine, pain in the lower abdomen, blood in the urine, and fever. If you were prescribed an antibiotic medicine, take it as told by your health care provider. Do not stop taking the antibiotic even if you start to feel better.    SEEK IMMEDIATE MEDICAL CARE IF YOU HAVE ANY OF THE FOLLOWING SYMPTOMS: severe back or abdominal pain, fever, inability to keep fluids or medicine down, dizziness/lightheadedness, or a change in mental status.    Kidney Stones    WHAT YOU NEED TO KNOW:    Kidney stones form in the urinary system when the water and waste in your urine are out of balance. When this happens, certain types of waste crystals separate from the urine. The crystals build up and form kidney stones. You may have more than one kidney stone.    Kidney Stones          DISCHARGE INSTRUCTIONS:    Seek care immediately if:   •You are vomiting and it is not relieved with medicine.          Call your doctor or kidney specialist if:   •You have a fever.      •You have trouble urinating.      •You see blood in your urine.      •You have severe pain.      •You have any questions or concerns about your condition or care.      Medicines: You may need any of the following:  •NSAIDs, such as ibuprofen, help decrease swelling, pain, and fever. This medicine is available with or without a doctor's order. NSAIDs can cause stomach bleeding or kidney problems in certain people. If you take blood thinner medicine, always ask your healthcare provider if NSAIDs are safe for you. Always read the medicine label and follow directions.      •Acetaminophen decreases pain and fever. It is available without a doctor's order. Ask how much to take and how often to take it. Follow directions. Read the labels of all other medicines you are using to see if they also contain acetaminophen, or ask your doctor or pharmacist. Acetaminophen can cause liver damage if not taken correctly. Do not use more than 4 grams (4,000 milligrams) total of acetaminophen in one day.       •Prescription pain medicine may be given. Ask your healthcare provider how to take this medicine safely. Some prescription pain medicines contain acetaminophen. Do not take other medicines that contain acetaminophen without talking to your healthcare provider. Too much acetaminophen may cause liver damage. Prescription pain medicine may cause constipation. Ask your healthcare provider how to prevent or treat constipation.       •Medicines to balance your electrolytes may be needed.      •Take your medicine as directed. Contact your healthcare provider if you think your medicine is not helping or if you have side effects. Tell him or her if you are allergic to any medicine. Keep a list of the medicines, vitamins, and herbs you take. Include the amounts, and when and why you take them. Bring the list or the pill bottles to follow-up visits. Carry your medicine list with you in case of an emergency.      What you can do to manage kidney stones:   •Drink more liquids. Your healthcare provider may tell you to drink at least 8 to 12 (eight-ounce) cups of liquids each day. This helps flush out the kidney stones when you urinate. Water is the best liquid to drink.      •Strain your urine every time you go to the bathroom. Urinate through a strainer or a piece of thin cloth to catch the stones. Take the stones to your healthcare provider so they can be sent to the lab for tests. This will help your healthcare providers plan the best treatment for you.  Look for Stones in the Filter           •Eat a variety of healthy foods. Healthy foods include fruits, vegetables, whole-grain breads, low-fat dairy products, beans, and fish. You may need to limit how much sodium (salt) or protein you eat. Ask for information about the best foods for you.  Healthy Foods           •Be physically active as directed. Your stones may pass more easily if you stay active. Physical activity can also help you manage your weight. Ask about the best activities for you.   FAMILY WALKING FOR EXERCISE           After you pass the kidney stones: Your healthcare provider may order a 24-hour urine test. Results from a 24-hour urine test will help your healthcare provider plan ways to prevent more stones from forming. Your healthcare provider will give you more instructions.    Follow up with your doctor or kidney specialist as directed: Write down your questions so you remember to ask them during your visits. Verbalizes understanding, Demonstrates understanding and Needs reinforcement    ASSESSMENT   Progress: progressing toward goals  Interfering components: lines/equipment and decreased activity tolerance    Discharge needs based on today's assessment:  - Current level of function: slightly below baseline level of function  - Therapy needs at discharge: does not require ongoing therapy  - Activities of daily living (ADLs) requiring support at discharge: bathing, transfers, ambulation and dressing  - Instrumental activities of daily living (IADLs) requiring support at discharge: shopping, meal preparation, driving and home management  - Impairments that require further therapy intervention: activity tolerance and safety awareness (compromised respiratory status)  AM-PAC  - Prior Level of Function: IND/MOD I ([facility] 22-24)       Key: MOD A=moderate assistance, IND/MOD I=independent/modified independent  - Generalized Current Level of Function     - Current Self-Cares: 20       Scoring Key= >21 Modified Independent; 20-21 Supervision; 18-19 Minimal assist; 13-17 Moderate assist; 9-12 Max assist; <9 Total assist      Pain at End of Session: RN informed on pain level  Pain: 0/10, location: pt reported no concerns with pain    PLAN (while hospitalized)  Suggestions for next session as indicated: Sink ADL's (bring chair)   Monitor SpO2  Reinforce energy conservation techniques    OT Frequency: 3-5 x per week      PT/OT Mobility Equipment for Discharge: none  PT/OT ADL Equipment for Discharge: shower chair  Agreement to plan and goals: patient agrees with goals and treatment plan      GOALS  Review Date: 10/10/2024  Long Term Goals: (to be met by time of discharge from hospital)  Grooming: Patient will complete grooming tasks modified independent.  Status: met   Upper body dressing: Patient will complete upper body dressing modified independent.  Status: progressing/ongoing  Lower body dressing: Patient will complete lower body  dressing modified independent.  Status: progressing/ongoing  Toileting: Patient will complete toileting modified independent.  Status: progressing/ongoing  Bathing: Patient will complete bathingsupervision   Status: progressing/ongoingToilet transfer: Patient will complete toilet transfer with modified independent.   Status: met   Walk in shower: Patient will complete walk in shower transfer with modified independent.   Status: progressing/ongoing  Home setting transfer: Patient will complete home setting transfers with modified independent.   Status: progressing/ongoing  Item retrieval: Patient will complete item retrieval modified independent.   Status: progressing/ongoing    Documented in the chart in the following areas: Assessment/Plan.    Patient at End of Session:   Location: in chair  Safety measures: call light within reach, equipment intact and lines intact  Handoff to: nurse      Therapy procedure time and total treatment time can be found documented on the Time Entry flowsheet

## 2025-01-14 ENCOUNTER — OFFICE (OUTPATIENT)
Dept: URBAN - METROPOLITAN AREA CLINIC 112 | Facility: CLINIC | Age: 60
Setting detail: OPHTHALMOLOGY
End: 2025-01-14
Payer: COMMERCIAL

## 2025-01-14 DIAGNOSIS — H35.373: ICD-10-CM

## 2025-01-14 DIAGNOSIS — Z96.1: ICD-10-CM

## 2025-01-14 DIAGNOSIS — H43.391: ICD-10-CM

## 2025-01-14 DIAGNOSIS — H16.223: ICD-10-CM

## 2025-01-14 PROBLEM — H16.221 DRY EYE SYNDROME K SICCA; RIGHT EYE, LEFT EYE, BOTH EYES: Status: ACTIVE | Noted: 2025-01-14

## 2025-01-14 PROBLEM — H16.222 DRY EYE SYNDROME K SICCA; RIGHT EYE, LEFT EYE, BOTH EYES: Status: ACTIVE | Noted: 2025-01-14

## 2025-01-14 PROCEDURE — 99213 OFFICE O/P EST LOW 20 MIN: CPT | Performed by: OPHTHALMOLOGY

## 2025-01-14 PROCEDURE — 92250 FUNDUS PHOTOGRAPHY W/I&R: CPT | Performed by: OPHTHALMOLOGY

## 2025-01-14 ASSESSMENT — KERATOMETRY
OS_AXISANGLE_DEGREES: 139
OS_K2POWER_DIOPTERS: 40.75
OD_K1POWER_DIOPTERS: 40.00
OS_K1POWER_DIOPTERS: 40.25
OD_K2POWER_DIOPTERS: 40.50
OD_AXISANGLE_DEGREES: 014
METHOD_AUTO_MANUAL: AUTO

## 2025-01-14 ASSESSMENT — PUNCTA - ASSESSMENT
OS_PUNCTA: LLL
OD_PUNCTA: COL PLUG RLL

## 2025-01-14 ASSESSMENT — REFRACTION_MANIFEST
OS_CYLINDER: SPH
OS_SPHERE: -0.25
OD_AXIS: 090
OD_VA1: 20/20
OD_CYLINDER: -0.50
OS_VA1: 20/20
OD_SPHERE: PLANO

## 2025-01-14 ASSESSMENT — TEAR BREAK UP TIME (TBUT)
OS_TBUT: 2+
OD_TBUT: 2+

## 2025-01-14 ASSESSMENT — REFRACTION_AUTOREFRACTION
OD_AXIS: 105
OS_AXIS: 077
OD_SPHERE: -0.25
OS_SPHERE: -0.50
OD_CYLINDER: -1.00
OS_CYLINDER: -0.50

## 2025-01-14 ASSESSMENT — CONFRONTATIONAL VISUAL FIELD TEST (CVF)
OD_FINDINGS: FULL
OS_FINDINGS: FULL

## 2025-01-14 ASSESSMENT — SUPERFICIAL PUNCTATE KERATITIS (SPK)
OD_SPK: 1+ 2+
OS_SPK: 1+ 2+

## 2025-01-14 ASSESSMENT — TONOMETRY
OS_IOP_MMHG: 13
OD_IOP_MMHG: 12

## 2025-01-14 ASSESSMENT — LID EXAM ASSESSMENTS
OD_BLEPHARITIS: 1+
OS_BLEPHARITIS: 1+

## 2025-01-14 ASSESSMENT — VISUAL ACUITY
OS_BCVA: 20/30+1
OD_BCVA: 20/25+1

## 2025-01-20 NOTE — ED ADULT TRIAGE NOTE - PRO INTERPRETER NEED 2
01/20/25 1000   Home Oxygen Assessment (RN/RT ONLY)   Does patient have oxygen at home? No   1. SpO2 on room air at rest while awake 88   2. SpO2 while at rest on oxygen 91       Oxygen LPM at rest 2   3. SpO2 on room air during Activity/with exercise 89   4. SpO2 with oxygen during activity/with exercise 92       Oxygen LPM during activity/with exercise 2   Does patient qualify for Home O2? Yes       
   01/20/25 1000   Home Oxygen Assessment (RN/RT ONLY)   Does patient have oxygen at home? No   1. SpO2 on room air at rest while awake 91   2. SpO2 while at rest on oxygen 90       Oxygen LPM at rest 2   3. SpO2 on room air during Activity/with exercise 91   4. SpO2 with oxygen during activity/with exercise 92       Oxygen LPM during activity/with exercise 2   Does patient qualify for Home O2? Yes       
Duration Of Freeze Thaw-Cycle (Seconds): 0
Number Of Freeze-Thaw Cycles: 1 freeze-thaw cycle
Detail Level: Detailed
Render Post-Care Instructions In Note?: yes
Render Note In Bullet Format When Appropriate: No
Consent: The patient's consent was obtained including but not limited to risks of crusting, scabbing, blistering, scarring, darker or lighter pigmentary change, recurrence, incomplete removal and infection.
Post-Care Instructions: I reviewed with the patient in detail post-care instructions. Patient is to wear sunprotection, and avoid picking at any of the treated lesions. Pt may apply Vaseline to crusted or scabbing areas.
English
Patient/Caregiver provided printed discharge information.

## 2025-03-18 ENCOUNTER — OFFICE (OUTPATIENT)
Dept: URBAN - METROPOLITAN AREA CLINIC 114 | Facility: CLINIC | Age: 60
Setting detail: OPHTHALMOLOGY
End: 2025-03-18
Payer: COMMERCIAL

## 2025-03-18 ENCOUNTER — RX ONLY (RX ONLY)
Age: 60
End: 2025-03-18

## 2025-03-18 DIAGNOSIS — H43.391: ICD-10-CM

## 2025-03-18 DIAGNOSIS — H16.221: ICD-10-CM

## 2025-03-18 DIAGNOSIS — H35.373: ICD-10-CM

## 2025-03-18 DIAGNOSIS — H16.223: ICD-10-CM

## 2025-03-18 DIAGNOSIS — H16.222: ICD-10-CM

## 2025-03-18 PROBLEM — H52.13 MYOPIA; BOTH EYES: Status: ACTIVE | Noted: 2025-03-18

## 2025-03-18 PROCEDURE — 83861 MICROFLUID ANALY TEARS: CPT | Mod: LT | Performed by: OPHTHALMOLOGY

## 2025-03-18 PROCEDURE — 83861 MICROFLUID ANALY TEARS: CPT | Mod: RT | Performed by: OPHTHALMOLOGY

## 2025-03-18 PROCEDURE — 99213 OFFICE O/P EST LOW 20 MIN: CPT | Performed by: OPHTHALMOLOGY

## 2025-03-18 ASSESSMENT — KERATOMETRY
OS_K1POWER_DIOPTERS: 40.25
OD_K1POWER_DIOPTERS: 40.00
OS_K2POWER_DIOPTERS: 40.75
OD_AXISANGLE_DEGREES: 014
METHOD_AUTO_MANUAL: AUTO
OD_K2POWER_DIOPTERS: 40.50
OS_AXISANGLE_DEGREES: 139

## 2025-03-18 ASSESSMENT — REFRACTION_MANIFEST
OD_SPHERE: PLANO
OS_SPHERE: PLANO
OS_AXIS: 062
OS_CYLINDER: -0.25
OD_AXIS: 103
OS_VA1: 20/20
OU_VA: 20/20
OD_CYLINDER: -0.50
OS_SPHERE: -0.25
OS_CYLINDER: SPH
OD_VA1: 20/20
OD_AXIS: 090
OS_VA1: 20/20-1
OD_CYLINDER: -0.75
OD_SPHERE: -0.50
OD_VA1: 20/20

## 2025-03-18 ASSESSMENT — VISUAL ACUITY
OD_BCVA: 20/30-1
OS_BCVA: 20/40-1

## 2025-03-18 ASSESSMENT — SUPERFICIAL PUNCTATE KERATITIS (SPK)
OD_SPK: 1+ 2+
OS_SPK: 1+ 2+

## 2025-03-18 ASSESSMENT — REFRACTION_AUTOREFRACTION
OS_CYLINDER: -0.25
OD_CYLINDER: -0.75
OS_SPHERE: -0.25
OD_SPHERE: -0.50
OD_AXIS: 103
OS_AXIS: 062

## 2025-03-18 ASSESSMENT — PUNCTA - ASSESSMENT
OD_PUNCTA: COL PLUG RLL
OS_PUNCTA: LLL

## 2025-03-18 ASSESSMENT — TEAR BREAK UP TIME (TBUT)
OS_TBUT: 2+
OD_TBUT: 2+

## 2025-03-18 ASSESSMENT — CONFRONTATIONAL VISUAL FIELD TEST (CVF)
OD_FINDINGS: FULL
OS_FINDINGS: FULL

## 2025-03-18 ASSESSMENT — LID EXAM ASSESSMENTS
OD_BLEPHARITIS: 1+
OS_BLEPHARITIS: 1+

## 2025-07-21 ENCOUNTER — OFFICE (OUTPATIENT)
Dept: URBAN - METROPOLITAN AREA CLINIC 94 | Facility: CLINIC | Age: 60
Setting detail: OPHTHALMOLOGY
End: 2025-07-21
Payer: COMMERCIAL

## 2025-07-21 DIAGNOSIS — H35.033: ICD-10-CM

## 2025-07-21 DIAGNOSIS — H16.223: ICD-10-CM

## 2025-07-21 DIAGNOSIS — Z96.1: ICD-10-CM

## 2025-07-21 PROCEDURE — 92012 INTRM OPH EXAM EST PATIENT: CPT | Performed by: PHYSICIAN ASSISTANT

## 2025-07-21 ASSESSMENT — REFRACTION_MANIFEST
OD_VA1: 20/20
OS_AXIS: 062
OD_SPHERE: PLANO
OS_AXIS: 065
OS_SPHERE: -0.25
OS_CYLINDER: SPH
OD_CYLINDER: -0.50
OS_VA1: 20/20
OD_SPHERE: -0.50
OD_CYLINDER: -0.50
OD_AXIS: 103
OD_VA1: 20/20
OU_VA: 20/20
OD_AXIS: 090
OS_VA1: 20/20-1
OS_SPHERE: -0.50
OS_VA1: 20/20
OD_VA1: 20/20
OS_SPHERE: PLANO
OD_AXIS: 120
OD_SPHERE: -0.75
OS_CYLINDER: -0.25
OS_CYLINDER: -0.75
OD_CYLINDER: -0.75

## 2025-07-21 ASSESSMENT — KERATOMETRY
OS_K2POWER_DIOPTERS: 40.75
METHOD_AUTO_MANUAL: AUTO
OS_AXISANGLE_DEGREES: 135
OS_K1POWER_DIOPTERS: 40.25
OD_AXISANGLE_DEGREES: 015
OD_K1POWER_DIOPTERS: 40.00
OD_K2POWER_DIOPTERS: 40.50

## 2025-07-21 ASSESSMENT — REFRACTION_AUTOREFRACTION
OS_AXIS: 065
OS_SPHERE: 0.00
OS_CYLINDER: -0.75
OD_SPHERE: -0.25
OD_AXIS: 115
OD_CYLINDER: -0.50

## 2025-07-21 ASSESSMENT — VISUAL ACUITY
OS_BCVA: 20/40
OD_BCVA: 20/40

## 2025-07-21 ASSESSMENT — CONFRONTATIONAL VISUAL FIELD TEST (CVF)
OS_FINDINGS: FULL
OD_FINDINGS: FULL

## 2025-07-21 ASSESSMENT — PUNCTA - ASSESSMENT
OS_PUNCTA: LLL
OD_PUNCTA: COL PLUG RLL

## 2025-07-21 ASSESSMENT — TONOMETRY
OS_IOP_MMHG: 14
OD_IOP_MMHG: 12

## 2025-07-21 ASSESSMENT — LID EXAM ASSESSMENTS
OD_BLEPHARITIS: 1+
OS_BLEPHARITIS: 1+

## 2025-07-21 ASSESSMENT — SUPERFICIAL PUNCTATE KERATITIS (SPK)
OS_SPK: 1+ 2+
OD_SPK: 1+ 2+